# Patient Record
Sex: FEMALE | Race: WHITE | NOT HISPANIC OR LATINO | Employment: OTHER | URBAN - METROPOLITAN AREA
[De-identification: names, ages, dates, MRNs, and addresses within clinical notes are randomized per-mention and may not be internally consistent; named-entity substitution may affect disease eponyms.]

---

## 2018-02-19 ENCOUNTER — OFFICE VISIT (OUTPATIENT)
Dept: OBSTETRICS AND GYNECOLOGY | Facility: CLINIC | Age: 28
End: 2018-02-19
Attending: OBSTETRICS & GYNECOLOGY
Payer: COMMERCIAL

## 2018-02-19 VITALS
HEIGHT: 64 IN | DIASTOLIC BLOOD PRESSURE: 70 MMHG | BODY MASS INDEX: 24.17 KG/M2 | SYSTOLIC BLOOD PRESSURE: 120 MMHG | WEIGHT: 141.56 LBS

## 2018-02-19 DIAGNOSIS — Z01.419 WELL FEMALE EXAM WITH ROUTINE GYNECOLOGICAL EXAM: Primary | ICD-10-CM

## 2018-02-19 PROCEDURE — 88175 CYTOPATH C/V AUTO FLUID REDO: CPT

## 2018-02-19 PROCEDURE — 99999 PR PBB SHADOW E&M-EST. PATIENT-LVL III: CPT | Mod: PBBFAC,,, | Performed by: OBSTETRICS & GYNECOLOGY

## 2018-02-19 PROCEDURE — 99385 PREV VISIT NEW AGE 18-39: CPT | Mod: S$GLB,,, | Performed by: OBSTETRICS & GYNECOLOGY

## 2018-02-19 NOTE — PROGRESS NOTES
SUBJECTIVE:   27 y.o. female   for routine gyn exam. Patient's last menstrual period was 2018..  She has no unusual complaints.Has Mirena since .  Considering pregnancy later this year.         Past Medical History:   Diagnosis Date    Abnormal Pap smear     since , Colpo + HPV    Anemia     Asthma     mild     Past Surgical History:   Procedure Laterality Date    COLPOSCOPY      KIDNEY STONE SURGERY  2015    right shoulder      SHOULDER SURGERY      right shoulder    THUMB ARTHROSCOPY      thumb surgery    thumb surgery       Social History     Social History    Marital status: Single     Spouse name: N/A    Number of children: N/A    Years of education: N/A     Occupational History    [employer]        Social History Main Topics    Smoking status: Never Smoker    Smokeless tobacco: Never Used    Alcohol use Yes      Comment: socially    Drug use: No    Sexual activity: Yes     Partners: Male     Birth control/ protection: IUD      Comment: Mirena/2012     Other Topics Concern    Not on file     Social History Narrative    No narrative on file     Family History   Problem Relation Age of Onset    Ovarian cancer Paternal Grandmother     Colon cancer Neg Hx     Breast cancer Neg Hx      OB History    Para Term  AB Living   1 1 1     1   SAB TAB Ectopic Multiple Live Births           1      # Outcome Date GA Lbr Oneil/2nd Weight Sex Delivery Anes PTL Lv   1 Term 12 37w0d  2.863 kg (6 lb 5 oz) F Vag-Spont EPI  JEVON      Birth Comments: System Generated. Please review and update pregnancy details.            No current outpatient prescriptions on file.     No current facility-administered medications for this visit.      Allergies: Patient has no known allergies.     ROS:  Constitutional: no weight loss, weight gain, fever, fatigue  Eyes:  No vision changes, glasses/contacts  ENT/Mouth: No ulcers, sinus problems, ears ringing,  "headache  Cardiovascular: No inability to lie flat, chest pain, exercise intolerance, swelling, heart palpitations  Respiratory: No wheezing, coughing blood, shortness of breath, or cough  Gastrointestinal: No diarrhea, bloody stool, nausea/vomiting, constipation, gas, hemorrhoids  Genitourinary: No blood in urine, painful urination, urgency of urination, frequency of urination, incomplete emptying, incontinence, abnormal bleeding, painful periods, heavy periods, vaginal discharge, vaginal odor, painful intercourse, sexual problems, bleeding after intercourse.  Musculoskeletal: No muscle weakness  Skin/Breast: No painful breasts, nipple discharge, masses, rash, ulcers  Neurological: No passing out, seizures, numbness, headache  Endocrine: No diabetes, hypothyroid, hyperthyroid, hot flashes, hair loss, abnormal hair growth, ance  Psychiatric: No depression, crying  Hematologic: No bruises, bleeding, swollen lymph nodes, anemia.      OBJECTIVE:   The patient appears well, alert, oriented x 3, in no distress.  /70 (BP Location: Left arm, Patient Position: Sitting, BP Method: Medium (Manual))   Ht 5' 4" (1.626 m)   Wt 64.2 kg (141 lb 8.6 oz)   LMP 01/28/2018 Comment: Mirena  BMI 24.29 kg/m²   NECK: no thyromegaly, trachea midline  SKIN: no acne, striae, hirsutism  CHEST: CTAB  CV: RRR  BREAST EXAM: breasts appear normal, no suspicious masses, no skin or nipple changes or axillary nodes  ABDOMEN: no hernias, masses, or hepatosplenomegaly  GENITALIA: normal external genitalia, no erythema, no discharge  URETHRA: normal urethra, normal urethral meatus  VAGINA: Normal  CERVIX: no lesions or cervical motion tenderness.  IUD strings seen  UTERUS: normal size, contour, position, consistency, mobility, non-tender  ADNEXA: no mass, fullness, tenderness      ASSESSMENT:   1. Well female exam with routine gynecological exam  Liquid-based pap smear, screening       PLAN:   Orders Placed This Encounter    Liquid-based pap " smear, screening     Discussed Mirena approved for 5 years in [place], but up to 7 years in [place].  Discussed IUD removal now, OCP's until pregnancy desires.  Declines removal today.  Patient will RTC summer time for IUD removal.  Return to clinic in 1 year

## 2018-06-05 ENCOUNTER — TELEPHONE (OUTPATIENT)
Dept: OBSTETRICS AND GYNECOLOGY | Facility: CLINIC | Age: 28
End: 2018-06-05

## 2018-06-05 NOTE — TELEPHONE ENCOUNTER
----- Message from Cecilia Luke sent at 6/5/2018  8:00 AM CDT -----  Contact: REINIER FREDERICK [[id number]]  Name of Who is Calling: REINIER FREDERICK [[id number]]    What is the request in detail: pt would like to have IUD removed on today states she is having problems with IUD. Please call     Can the clinic reply by [facility]: no    What Number to Call Back if not in [facility]: [phone]

## 2018-06-05 NOTE — TELEPHONE ENCOUNTER
Returned call -discuss no available appointment today, but could schedule IUD removal Wednesday. Patient would like medication for possible UTI. Advised to call [facility] or will address during visit Wednesday. She verbalized understanding

## 2018-06-06 ENCOUNTER — OFFICE VISIT (OUTPATIENT)
Dept: OBSTETRICS AND GYNECOLOGY | Facility: CLINIC | Age: 28
End: 2018-06-06
Attending: OBSTETRICS & GYNECOLOGY
Payer: COMMERCIAL

## 2018-06-06 VITALS
HEIGHT: 64 IN | SYSTOLIC BLOOD PRESSURE: 120 MMHG | DIASTOLIC BLOOD PRESSURE: 70 MMHG | WEIGHT: 145.94 LBS | BODY MASS INDEX: 24.92 KG/M2

## 2018-06-06 DIAGNOSIS — R31.0 GROSS HEMATURIA: ICD-10-CM

## 2018-06-06 DIAGNOSIS — Z30.432 ENCOUNTER FOR IUD REMOVAL: Primary | ICD-10-CM

## 2018-06-06 PROCEDURE — 58301 REMOVE INTRAUTERINE DEVICE: CPT | Mod: S$GLB,,, | Performed by: OBSTETRICS & GYNECOLOGY

## 2018-06-06 PROCEDURE — 3008F BODY MASS INDEX DOCD: CPT | Mod: CPTII,S$GLB,, | Performed by: OBSTETRICS & GYNECOLOGY

## 2018-06-06 PROCEDURE — 81001 URINALYSIS AUTO W/SCOPE: CPT

## 2018-06-06 PROCEDURE — 87086 URINE CULTURE/COLONY COUNT: CPT

## 2018-06-06 PROCEDURE — 99999 PR PBB SHADOW E&M-EST. PATIENT-LVL III: CPT | Mod: PBBFAC,,, | Performed by: OBSTETRICS & GYNECOLOGY

## 2018-06-06 PROCEDURE — 99213 OFFICE O/P EST LOW 20 MIN: CPT | Mod: 25,S$GLB,, | Performed by: OBSTETRICS & GYNECOLOGY

## 2018-06-07 LAB
BACTERIA #/AREA URNS AUTO: ABNORMAL /HPF
BILIRUB UR QL STRIP: NEGATIVE
CLARITY UR REFRACT.AUTO: ABNORMAL
COLOR UR AUTO: YELLOW
GLUCOSE UR QL STRIP: NEGATIVE
HGB UR QL STRIP: ABNORMAL
KETONES UR QL STRIP: NEGATIVE
LEUKOCYTE ESTERASE UR QL STRIP: NEGATIVE
MICROSCOPIC COMMENT: ABNORMAL
NITRITE UR QL STRIP: NEGATIVE
PH UR STRIP: 7 [PH] (ref 5–8)
PROT UR QL STRIP: NEGATIVE
RBC #/AREA URNS AUTO: 10 /HPF (ref 0–4)
SP GR UR STRIP: 1.01 (ref 1–1.03)
SQUAMOUS #/AREA URNS AUTO: 6 /HPF
URN SPEC COLLECT METH UR: ABNORMAL
UROBILINOGEN UR STRIP-ACNC: NEGATIVE EU/DL
WBC #/AREA URNS AUTO: 3 /HPF (ref 0–5)

## 2018-06-07 NOTE — PROGRESS NOTES
SUBJECTIVE:   27 y.o. female   for IUD removal. No LMP recorded. Patient is not currently having periods (Reason: Birth Control)..  Desires pregnancy.  Reports blood in urine and urinary frequency.  Given Levaquin by PCP.  Also has some bloating and mild right sided pain.         Past Medical History:   Diagnosis Date    Abnormal Pap smear     since , Colpo + HPV    Anemia     Asthma     mild     Past Surgical History:   Procedure Laterality Date    COLPOSCOPY      KIDNEY STONE SURGERY  2015    right shoulder      SHOULDER SURGERY      right shoulder    THUMB ARTHROSCOPY      thumb surgery    thumb surgery       Social History     Social History    Marital status: Single     Spouse name: N/A    Number of children: N/A    Years of education: N/A     Occupational History    [employer]        Social History Main Topics    Smoking status: Never Smoker    Smokeless tobacco: Never Used    Alcohol use Yes      Comment: socially    Drug use: No    Sexual activity: Yes     Partners: Male     Birth control/ protection: IUD      Comment: Mirena/2012     Other Topics Concern    Not on file     Social History Narrative    No narrative on file     Family History   Problem Relation Age of Onset    Ovarian cancer Paternal Grandmother     Colon cancer Neg Hx     Breast cancer Neg Hx      OB History    Para Term  AB Living   1 1 1     1   SAB TAB Ectopic Multiple Live Births           1      # Outcome Date GA Lbr Oneil/2nd Weight Sex Delivery Anes PTL Lv   1 Term 12 37w0d  2.863 kg (6 lb 5 oz) F Vag-Spont EPI  JEVON      Birth Comments: System Generated. Please review and update pregnancy details.            Current Outpatient Prescriptions   Medication Sig Dispense Refill    levoFLOXacin (LEVAQUIN) 500 MG tablet Take 1 tablet (500 mg total) by mouth once daily. 5 tablet 0     No current facility-administered medications for this visit.      Allergies: Patient  "has no known allergies.     ROS:  Constitutional: no weight loss, weight gain, fever, fatigue  Eyes:  No vision changes, glasses/contacts  ENT/Mouth: No ulcers, sinus problems, ears ringing, headache  Cardiovascular: No inability to lie flat, chest pain, exercise intolerance, swelling, heart palpitations  Respiratory: No wheezing, coughing blood, shortness of breath, or cough  Gastrointestinal: No diarrhea, bloody stool, nausea/vomiting, constipation, gas, hemorrhoids  Genitourinary: +blood in urine, no painful urination, +urgency of urination, frequency of urination, incomplete emptying, incontinence, abnormal bleeding, painful periods, heavy periods, vaginal discharge, vaginal odor, painful intercourse, sexual problems, bleeding after intercourse.  Musculoskeletal: No muscle weakness  Skin/Breast: No painful breasts, nipple discharge, masses, rash, ulcers  Neurological: No passing out, seizures, numbness, headache  Endocrine: No diabetes, hypothyroid, hyperthyroid, hot flashes, hair loss, abnormal hair growth, ance  Psychiatric: No depression, crying  Hematologic: No bruises, bleeding, swollen lymph nodes, anemia.      OBJECTIVE:   The patient appears well, alert, oriented x 3, in no distress.  /70 (BP Location: Right arm, Patient Position: Sitting, BP Method: Medium (Manual))   Ht 5' 4" (1.626 m)   Wt 66.2 kg (145 lb 15.1 oz)   BMI 25.05 kg/m²   ABDOMEN: no hernias, masses, or hepatosplenomegaly  GENITALIA: normal external genitalia, no erythema, bloody discharge  URETHRA: normal urethra, normal urethral meatus  VAGINA: Normal  CERVIX: no lesions or cervical motion tenderness.  IUD strings seen  UTERUS: normal size, contour, position, consistency, mobility, non-tender  ADNEXA: no mass, fullness, tenderness    IUD removed    ASSESSMENT:   1. Encounter for IUD removal     2. Gross hematuria  Urinalysis    Urine culture       PLAN:   Orders Placed This Encounter    Urine culture    Urinalysis "     Discussed IUD removal, folic acid  Discussed urinary sx.  Plan U/A C&S.  Consider pelvic u/s if no improvement on ABX  Return to clinic prn

## 2018-06-08 LAB — BACTERIA UR CULT: NO GROWTH

## 2018-06-11 ENCOUNTER — TELEPHONE (OUTPATIENT)
Dept: UROLOGY | Facility: CLINIC | Age: 28
End: 2018-06-11

## 2018-06-11 ENCOUNTER — OFFICE VISIT (OUTPATIENT)
Dept: UROLOGY | Facility: CLINIC | Age: 28
End: 2018-06-11
Payer: COMMERCIAL

## 2018-06-11 ENCOUNTER — HOSPITAL ENCOUNTER (OUTPATIENT)
Dept: RADIOLOGY | Facility: HOSPITAL | Age: 28
Discharge: HOME | End: 2018-06-11
Attending: NURSE PRACTITIONER
Payer: COMMERCIAL

## 2018-06-11 VITALS
HEIGHT: 64 IN | SYSTOLIC BLOOD PRESSURE: 118 MMHG | HEART RATE: 78 BPM | BODY MASS INDEX: 24.41 KG/M2 | DIASTOLIC BLOOD PRESSURE: 82 MMHG | WEIGHT: 143 LBS

## 2018-06-11 DIAGNOSIS — R39.89 SENSATION OF PRESSURE IN BLADDER AREA: ICD-10-CM

## 2018-06-11 DIAGNOSIS — R35.0 URINARY FREQUENCY: ICD-10-CM

## 2018-06-11 DIAGNOSIS — Z87.442 HISTORY OF KIDNEY STONES: ICD-10-CM

## 2018-06-11 DIAGNOSIS — R35.0 URINARY FREQUENCY: Primary | ICD-10-CM

## 2018-06-11 DIAGNOSIS — R39.89 BLADDER PAIN: ICD-10-CM

## 2018-06-11 DIAGNOSIS — R10.9 FLANK PAIN: ICD-10-CM

## 2018-06-11 DIAGNOSIS — N20.1 LEFT URETERAL CALCULUS: ICD-10-CM

## 2018-06-11 LAB
BILIRUB SERPL-MCNC: NORMAL MG/DL
BLOOD URINE, POC: 250
COLOR, POC UA: YELLOW
GLUCOSE UR QL STRIP: NORMAL
KETONES UR QL STRIP: NORMAL
LEUKOCYTE ESTERASE URINE, POC: NORMAL
NITRITE, POC UA: NORMAL
PH, POC UA: 8
PROTEIN, POC: NORMAL
SPECIFIC GRAVITY, POC UA: 1
UROBILINOGEN, POC UA: NORMAL

## 2018-06-11 PROCEDURE — 74018 RADEX ABDOMEN 1 VIEW: CPT | Mod: TC

## 2018-06-11 PROCEDURE — 3008F BODY MASS INDEX DOCD: CPT | Mod: CPTII,S$GLB,, | Performed by: NURSE PRACTITIONER

## 2018-06-11 PROCEDURE — 81001 URINALYSIS AUTO W/SCOPE: CPT | Mod: S$GLB,,, | Performed by: NURSE PRACTITIONER

## 2018-06-11 PROCEDURE — 99214 OFFICE O/P EST MOD 30 MIN: CPT | Mod: 25,S$GLB,, | Performed by: NURSE PRACTITIONER

## 2018-06-11 PROCEDURE — 99999 PR PBB SHADOW E&M-EST. PATIENT-LVL IV: CPT | Mod: PBBFAC,,, | Performed by: NURSE PRACTITIONER

## 2018-06-11 PROCEDURE — 74018 RADEX ABDOMEN 1 VIEW: CPT | Mod: 26,,, | Performed by: RADIOLOGY

## 2018-06-11 RX ORDER — PHENAZOPYRIDINE HYDROCHLORIDE 200 MG/1
200 TABLET, FILM COATED ORAL 3 TIMES DAILY PRN
Qty: 15 TABLET | Refills: 0 | Status: SHIPPED | OUTPATIENT
Start: 2018-06-11 | End: 2018-06-16

## 2018-06-11 NOTE — TELEPHONE ENCOUNTER
Spoke with patient regarding her KUB results.  After reviewing KUB with Dr. Ledesma, will cancel the renal US and order CT RSS.  CT RSS ordered and scheduled.  Pt verbalized understanding.

## 2018-06-11 NOTE — PATIENT INSTRUCTIONS
AZO    Get prompt medical attention if any of the following occur:  · Severe pain that returns and not relieved by pain medicines  · Repeated vomiting or unable to keep down fluids  · Weakness, dizziness or fainting  · Fever of 101.4ºF (38ºC) or higher, or as directed by your healthcare provider  · Blood clots in urine  · Foul smelling or cloudy urine  · Unable to pass urine for 8 hours or increasing bladder pressure      Increase water intake 2-3 liters per day.     Avoid Bladder Irritants: Tea, coffee, caffeine, alcohol, artificial sweeteners, citrus, spicy foods, acidic foods,chocolate, tomato-based foods, smoking.      Cystoscopy    Cystoscopy is a procedure that lets your doctor look directly inside your urethra and bladder. It can be used to:  · Help diagnose a problem with your urethra, bladder, or kidneys.  · Take a sample (biopsy) of bladder or urethral tissue.  · Treat certain problems (such as removing kidney stones).  · Place a stent to bypass an obstruction.  · Take special X-rays of the kidneys.  Based on the findings, your doctor may recommend other tests or treatments.  What is a cystoscope?  A cystoscope is a telescope-like instrument that contains lenses and fiberoptics (small glass wires that make bright light). The cystoscope may be straight and rigid, or flexible to bend around curves in the urethra. The doctor may look directly into the cystoscope, or project the image onto a monitor.  Getting ready  · Ask your doctor if you should stop taking any medicines before the procedure.  · Ask whether you should avoid eating or drinking anything after midnight before the procedure.  · Follow any other instructions your doctor gives you.  Tell your doctor before the exam if you:  · Take any medicines, such as aspirin or blood thinners  · Have allergies to any medicines  · Are pregnant   The procedure  Cystoscopy is done in the doctors office, surgery center, or hospital. The doctor and a nurse are  present during the procedure. It takes only a few minutes, longer if a biopsy, X-ray, or treatment needs to be done.  During the procedure:  · You lie on an exam table on your back, knees bent and legs apart. You are covered with a drape.  · Your urethra and the area around it are washed. Anesthetic jelly may be applied to numb the urethra. Other pain medicine is usually not needed. In some cases, you may be offered a mild sedative to help you relax. If a more extensive procedure is to be done, such as a biopsy or kidney stone removal, general anesthesia may be needed.  · The cystoscope is inserted. A sterile fluid is put into the bladder to expand it. You may feel pressure from this fluid.  · When the procedure is done, the cystoscope is removed.  After the procedure  If you had a sedative, general anesthesia, or spinal anesthesia, you must have someone drive you home. Once youre home:  · Drink plenty of fluids.  · You may have burning or light bleeding when you urinate--this is normal.  · Medicines may be prescribed to ease any discomfort or prevent infection. Take these as directed.  · Call your doctor if you have heavy bleeding or blood clots, burning that lasts more than a day, a fever over 100°F  (38° C), or trouble urinating.  Date Last Reviewed: 1/1/2017 © 2000-2017 [employer]. [address] [place] [address], [place] [address]. All rights reserved. This information is not intended as a substitute for professional medical care. Always follow your healthcare professional's instructions.

## 2018-06-11 NOTE — PROGRESS NOTES
Subjective:       Patient ID: Melissa Agustin is a 27 y.o. female.    Chief Complaint: Urinary Frequency; Urinary Tract Infection; and Nephrolithiasis (history)      HPI: Melissa Agustin is a 27 y.o. White female who presents today for evaluation and management of UTI, bladder pain and pressure, urinary frequency and hx of kidney stones. She is an established patient with [facility] but a new patient to me. Her last clinic visit was 4/23/16 with Dr. Ledesma.    Hx of right ureteral stone in 2016. Had ESWL.    Today she presents to clinic for bladder pain and pressure, urinary frequency, and possible UTI for the past week. She reports last Monday she had gross hematuria that has since resolved. She states she had a sharp pain to left lower back last week that has also resolved. She denies dysuria associated with hematuria or low back pain. She was treated for a UTI recently with levaquin that she completed 3 days ago (urine culture was negative).  She feels a throbbing bladder pain and constant pressure that has not improved with completing antibiotics. She reports urinary frequency every 20-30 minutes. She denies incontinence, difficulty urinating, urgency. Denies f/c/n/v.     6/6/18 Urine culture:  No growth    6/6/18 microscopic UA:  RBC 10    Review of patient's allergies indicates:  No Known Allergies    Current Outpatient Prescriptions   Medication Sig Dispense Refill    phenazopyridine (PYRIDIUM) 200 MG tablet Take 1 tablet (200 mg total) by mouth 3 (three) times daily as needed for Pain. 15 tablet 0     No current facility-administered medications for this visit.        Past Medical History:   Diagnosis Date    Abnormal Pap smear     since , Colpo + HPV    Anemia     Asthma     mild       Past Surgical History:   Procedure Laterality Date    COLPOSCOPY      KIDNEY STONE SURGERY  2015    right shoulder      SHOULDER SURGERY      right shoulder    THUMB ARTHROSCOPY      thumb surgery    thumb surgery          Family History   Problem Relation Age of Onset    Ovarian cancer Paternal Grandmother     Colon cancer Neg Hx     Breast cancer Neg Hx        Review of Systems   Constitutional: Negative for chills, diaphoresis, fatigue and fever.   HENT: Negative for congestion and trouble swallowing.    Eyes: Negative for visual disturbance.   Respiratory: Negative for chest tightness and shortness of breath.    Cardiovascular: Negative for chest pain and palpitations.   Gastrointestinal: Negative for nausea and vomiting.   Genitourinary: Positive for frequency, hematuria and pelvic pain (bladder pain / pressure). Negative for decreased urine volume, difficulty urinating, dysuria, enuresis and urgency.   Musculoskeletal: Negative for gait problem.   Skin: Negative for pallor and rash.   Allergic/Immunologic: Negative for immunocompromised state.   Neurological: Negative for dizziness, seizures, syncope, weakness, light-headedness and headaches.   Hematological: Negative for adenopathy.   Psychiatric/Behavioral: Negative for confusion. The patient is not nervous/anxious.          All other systems were reviewed and were negative.    Objective:     Vitals:    06/11/18 1349   BP: 118/82   Pulse: 78        Physical Exam   Nursing note and vitals reviewed.  Constitutional: She is oriented to person, place, and time. She appears well-developed and well-nourished.  Non-toxic appearance. She does not have a sickly appearance. She does not appear ill. No distress.   HENT:   Head: Normocephalic and atraumatic.   Eyes: Conjunctivae and EOM are normal.   Neck: Normal range of motion.   Cardiovascular: Normal rate and regular rhythm.    Pulmonary/Chest: Effort normal. No respiratory distress.   Abdominal: Soft. She exhibits no distension. There is no CVA tenderness.   Musculoskeletal: Normal range of motion.   Neurological: She is alert and oriented to person, place, and time.   Skin: Skin is warm and dry.     Psychiatric: She has a  normal mood and affect. Her behavior is normal. Judgment and thought content normal.         Lab Results   Component Value Date    CREATININE 0.9 03/17/2016     Lab Results   Component Value Date    EGFRNONAA >60.0 03/17/2016     Lab Results   Component Value Date    ESTGFRAFRICA >60.0 03/17/2016     Urine dipstick in clinic: 250 blood, otherwise negative    Assessment:       1. Urinary frequency    2. Sensation of pressure in bladder area    3. Bladder pain    4. History of kidney stones        Plan:     Melissa was seen today for urinary frequency, urinary tract infection and nephrolithiasis.    Diagnoses and all orders for this visit:    Urinary frequency  -     POCT urinalysis, dipstick or tablet reag  -     X-Ray Abdomen AP 1 View; Future  -     US Retroperitoneal Complete (Kidney and; Future    Sensation of pressure in bladder area  -     POCT urinalysis, dipstick or tablet reag  -     X-Ray Abdomen AP 1 View; Future  -     US Retroperitoneal Complete (Kidney and; Future  -     phenazopyridine (PYRIDIUM) 200 MG tablet; Take 1 tablet (200 mg total) by mouth 3 (three) times daily as needed for Pain.    Bladder pain  -     X-Ray Abdomen AP 1 View; Future  -     US Retroperitoneal Complete (Kidney and; Future    History of kidney stones  -     X-Ray Abdomen AP 1 View; Future  -     US Retroperitoneal Complete (Kidney and; Future    -Discussed plan of care with patient  -KUB and renal US ordered and scheduled  -Discussed side effects, indications, and MOA for pyridium. Prescription sent to the pharmacy. Pt verbalized understanding.  Use for bladder discomfort as needed.   -Increase water intake 2-3 liters per day  -Get prompt medical attention if any of the following occur:  · Severe pain that returns and not relieved by pain medicines  · Repeated vomiting or unable to keep down fluids  · Weakness, dizziness or fainting  · Fever of 101.4ºF (38ºC) or higher, or as directed by your healthcare provider  · Blood clots in  urine  · Foul smelling or cloudy urine  · Unable to pass urine for 8 hours or increasing bladder pressure  -Follow up pending imaging results        I spent 25 minutes with the patient of which more than half was spent in coordinating the patient's care as well as in direct consultation with the patient in regards to our treatment and plan.

## 2018-06-12 ENCOUNTER — TELEPHONE (OUTPATIENT)
Dept: UROLOGY | Facility: CLINIC | Age: 28
End: 2018-06-12

## 2018-06-12 DIAGNOSIS — N20.1 LEFT URETERAL STONE: Primary | ICD-10-CM

## 2018-06-12 DIAGNOSIS — N20.1 LEFT URETERAL STONE: ICD-10-CM

## 2018-06-12 RX ORDER — KETOROLAC TROMETHAMINE 10 MG/1
10 TABLET, FILM COATED ORAL EVERY 8 HOURS PRN
Qty: 20 TABLET | Refills: 0 | Status: SHIPPED | OUTPATIENT
Start: 2018-06-12 | End: 2018-06-17

## 2018-06-12 RX ORDER — TAMSULOSIN HYDROCHLORIDE 0.4 MG/1
0.4 CAPSULE ORAL DAILY
Qty: 20 CAPSULE | Refills: 0 | Status: SHIPPED | OUTPATIENT
Start: 2018-06-12 | End: 2018-06-12 | Stop reason: SDUPTHER

## 2018-06-12 RX ORDER — TAMSULOSIN HYDROCHLORIDE 0.4 MG/1
0.4 CAPSULE ORAL DAILY
Qty: 20 CAPSULE | Refills: 0 | Status: SHIPPED | OUTPATIENT
Start: 2018-06-12 | End: 2018-10-29 | Stop reason: ALTCHOICE

## 2018-06-12 RX ORDER — KETOROLAC TROMETHAMINE 10 MG/1
10 TABLET, FILM COATED ORAL EVERY 8 HOURS PRN
Qty: 20 TABLET | Refills: 0 | Status: SHIPPED | OUTPATIENT
Start: 2018-06-12 | End: 2018-06-12 | Stop reason: SDUPTHER

## 2018-06-12 RX ORDER — OXYCODONE AND ACETAMINOPHEN 5; 325 MG/1; MG/1
1 TABLET ORAL EVERY 6 HOURS PRN
Qty: 20 TABLET | Refills: 0 | Status: SHIPPED | OUTPATIENT
Start: 2018-06-12 | End: 2018-06-17

## 2018-06-12 NOTE — TELEPHONE ENCOUNTER
Spoke with patient regarding CT RSS results.  Impression: there are 2 punctate nonobstructing calculi seen in the left kidney.  There is a 4.5 mm calculus seen at the left ureteral vesicle junction, appearing as a new finding since prior study.    Discussed side effects, indications, and MOA for toradol. Prescription sent to the pharmacy. Pt verbalized understanding.  Discussed side effects, indications, and MOA for flomax. Prescription sent to the pharmacy. Pt verbalized understanding.    Will follow up in 3-4 weeks for repeat scan to see if passed stone. Instructed to strain urine and bring any stone fragments to lab for stone analysis.  Pt verbalized understanding.    Get prompt medical attention if any of the following occur:  · Severe pain that returns and not relieved by pain medicines  · Repeated vomiting or unable to keep down fluids  · Weakness, dizziness or fainting  · Fever of 101.4ºF (38ºC) or higher, or as directed by your healthcare provider  · Blood clots in urine  · Foul smelling or cloudy urine  · Unable to pass urine for 8 hours or increasing bladder pressure

## 2018-06-12 NOTE — TELEPHONE ENCOUNTER
Spoke with patient.   She continues to have increased bladder pain and pressure as well as lower abdominal pain.  Denies f/c/n/v.   Will order percocet as needed.  Discussed side effects, indications, and MOA for percocet. Prescription printed. Pt verbalized understanding. She will pick prescription up later in the day. Denies any reactions to percocet in the past.   viewed prior to dispensing.

## 2018-06-14 ENCOUNTER — TELEPHONE (OUTPATIENT)
Dept: UROLOGY | Facility: CLINIC | Age: 28
End: 2018-06-14

## 2018-06-14 DIAGNOSIS — N20.1 LEFT URETERAL STONE: Primary | ICD-10-CM

## 2018-06-14 DIAGNOSIS — N20.1 URETERAL STONE: Primary | ICD-10-CM

## 2018-06-14 DIAGNOSIS — N32.89 BLADDER SPASMS: ICD-10-CM

## 2018-06-14 RX ORDER — OXYBUTYNIN CHLORIDE 5 MG/1
5 TABLET ORAL 3 TIMES DAILY PRN
Qty: 30 TABLET | Refills: 0 | Status: SHIPPED | OUTPATIENT
Start: 2018-06-14 | End: 2018-10-29 | Stop reason: ALTCHOICE

## 2018-06-14 NOTE — TELEPHONE ENCOUNTER
Spoke with patient.  She is having throbbing, sharp pain in her bladder. She denies pain anywhere else.   Will order oxybutynin for bladder spasms.  Discussed side effects, indications, and MOA for oxybutynin. Prescription sent to the pharmacy. Pt verbalized understanding.    Consulted with Dr. Ledesma. Will put patient on surgery schedule for Friday 6/22/18 for possible ureteroscopy.  Will do KUB 6/20/18 prior to check stone.  Pt verbalized understanding.

## 2018-06-15 ENCOUNTER — TELEPHONE (OUTPATIENT)
Dept: PREADMISSION TESTING | Facility: HOSPITAL | Age: 28
End: 2018-06-15

## 2018-06-15 ENCOUNTER — ANESTHESIA EVENT (OUTPATIENT)
Dept: SURGERY | Facility: HOSPITAL | Age: 28
End: 2018-06-15
Payer: COMMERCIAL

## 2018-06-15 DIAGNOSIS — Z01.818 PREOP TESTING: Primary | ICD-10-CM

## 2018-06-15 NOTE — TELEPHONE ENCOUNTER
----- Message from Wilma Muhammad, RN sent at 6/15/2018 10:53 AM CDT -----  Needs hem profile ( hx anemia last h/h 9.5/28.4  2012)  // bmp  Pt is scheduled for KUB 6/20 at [facility] would like lab there.    Surgery 6/22      Maribell

## 2018-06-15 NOTE — PRE-PROCEDURE INSTRUCTIONS
Spoke to pt reviewed meds // sending instructions thru My Chart. Hx of anemia// last lab 2012 -- lab being ordered

## 2018-06-15 NOTE — PRE ADMISSION SCREENING
Anesthesia Assessment: Preoperative EQUATION    Planned Procedure: Procedure(s) (LRB):  URETEROSCOPY (Left)  LITHOTRIPSY, USING LASER (N/A)  Placement-Stent (Left)  Requested Anesthesia Type:General  Surgeon: Mau Ledesma Jr., MD  Service: Urology  Known or anticipated Date of Surgery:6/22/2018    Surgeon notes: reviewed    Electronic QUestionnaire Assessment completed via nurse interview with patient.      No Aq        Triage considerations:     The patient has no apparent active cardiac condition (No unstable coronary Syndrome such as severe unstable angina or recent [<1 month] myocardial infarction, decompensated CHF, severe valvular   disease or significant arrhythmia)    Previous anesthesia records:[facility] 3/17/16  [facility]    Last PCP note: within 1 month , within [facility] Dr. Vadiee  Subspecialty notes: urology    Other important co-morbidities:   Nephrolithiasis  Past hx anemia----------------last H/H 9.5/28.4   2012   hx of asthma as a child    Tests already available:  No recent tests.            Instructions given. (See in Nurse's note)    Optimization:     Plan:    Testing:  Hematology Profile and BMP   Pre-anesthesia  visit       Visit focus: no past history of anesthesia issues     Consultation:seen by pcp recently         Navigation: 27 yr old female with kidney stones, unable to pass. Scheduled for KUB 6/20 . If not passed will proceed with surgery.

## 2018-06-15 NOTE — ANESTHESIA PREPROCEDURE EVALUATION
Anesthesia Assessment: Preoperative EQUATION     Planned Procedure: Procedure(s) (LRB):  URETEROSCOPY (Left)  LITHOTRIPSY, USING LASER (N/A)  Placement-Stent (Left)  Requested Anesthesia Type:General  Surgeon: Mau Ledesma Jr., MD  Service: Urology  Known or anticipated Date of Surgery:6/22/2018     Surgeon notes: reviewed     Electronic QUestionnaire Assessment completed via nurse interview with patient.      No Aq           Triage considerations:      The patient has no apparent active cardiac condition (No unstable coronary Syndrome such as severe unstable angina or recent [<1 month] myocardial infarction, decompensated CHF, severe valvular   disease or significant arrhythmia)     Previous anesthesia records:GETA 3/17/16  ESWL     [facility]  Subspecialty notes: urology     Other important co-morbidities:   Nephrolithiasis  Past hx anemia----------------last H/H 9.5/28.4   2012   hx of asthma as a child     Tests already available:  No recent tests.                            Instructions given. (See in Nurse's note)     Optimization:               Plan:    Testing:  Hematology Profile and BMP   Pre-anesthesia  visit                                        Visit focus: no past history of anesthesia issues                           Consultation:seen by pcp recently                                Navigation: 27 yr old female with kidney stones, unable to pass. Scheduled for KUB 6/20 . If not passed will proceed with surgery.            06/15/2018  Melissa Agustin is a 27 y.o., female.    Anesthesia Evaluation    I have reviewed the Patient Summary Reports.    I have reviewed the Nursing Notes.   I have reviewed the Medications.     Review of Systems  Anesthesia Hx:  No problems with previous Anesthesia  History of prior surgery of interest to airway management or planning:  Previous anesthesia: MAC  2016 eswl with MAC.  Denies Family Hx of Anesthesia complications.   Denies Personal Hx of Anesthesia complications.   Social:  Non-Smoker, Social Alcohol Use    Hematology/Oncology:     Oncology Normal    -- Anemia: Hematology Comments: Last H/H 9.5/28.4   2012  Will repeat    EENT/Dental:EENT/Dental Normal   Cardiovascular:   Exercise tolerance: good    Pulmonary:   Asthma asymptomatic Asthma as a child   Renal/:   Chronic Renal Disease renal calculi    Hepatic/GI:  Hepatic/GI Normal    Musculoskeletal:  Musculoskeletal Normal    Neurological:  Neurology Normal    Endocrine:  Endocrine Normal    Dermatological:  Skin Normal    Psych:  Psychiatric Normal           Physical Exam  General:  Well nourished    Airway/Jaw/Neck:  Airway Findings: Mouth Opening: Normal Tongue: Normal  General Airway Assessment: Adult  Mallampati: II  Improves to II with phonation.  TM Distance: Normal, at least 6 cm  Jaw/Neck Findings:  Neck ROM: Normal ROM      Dental:  Dental Findings: In tact   Chest/Lungs:  Chest/Lungs Findings: Clear to auscultation, Normal Respiratory Rate     Heart/Vascular:  Heart Findings: Rate: Normal  Rhythm: Regular Rhythm  Sounds: Normal        Mental Status:  Mental Status Findings:  Cooperative, Alert and Oriented         Anesthesia Plan  Type of Anesthesia, risks & benefits discussed:  Anesthesia Type:  general  Patient's Preference:   Intra-op Monitoring Plan: standard ASA monitors  Intra-op Monitoring Plan Comments:   Post Op Pain Control Plan: multimodal analgesia  Post Op Pain Control Plan Comments:   Induction:   IV  Beta Blocker:         Informed Consent: Patient understands risks and agrees with Anesthesia plan.  Questions answered. Anesthesia consent signed with patient.  ASA Score: 1     Day of Surgery Review of History & Physical:    H&P update referred to the surgeon.         Ready For Surgery From Anesthesia Perspective.

## 2018-06-21 ENCOUNTER — TELEPHONE (OUTPATIENT)
Dept: UROLOGY | Facility: CLINIC | Age: 28
End: 2018-06-21

## 2018-06-21 NOTE — TELEPHONE ENCOUNTER
Called pt to confirm 8:30 arrival time for procedure. Gave pt NPO instructions and gave pt opportunity to ask questions. Pt verbalized understanding.        Spoke with patient told arrival time change to 8:30am

## 2018-06-22 ENCOUNTER — ANESTHESIA (OUTPATIENT)
Dept: SURGERY | Facility: HOSPITAL | Age: 28
End: 2018-06-22
Payer: COMMERCIAL

## 2018-06-22 ENCOUNTER — HOSPITAL ENCOUNTER (OUTPATIENT)
Facility: HOSPITAL | Age: 28
Discharge: HOME | End: 2018-06-22
Attending: UROLOGY | Admitting: UROLOGY
Payer: COMMERCIAL

## 2018-06-22 VITALS
BODY MASS INDEX: 24.41 KG/M2 | RESPIRATION RATE: 18 BRPM | HEIGHT: 64 IN | SYSTOLIC BLOOD PRESSURE: 114 MMHG | HEART RATE: 70 BPM | WEIGHT: 143 LBS | DIASTOLIC BLOOD PRESSURE: 71 MMHG | TEMPERATURE: 98 F | OXYGEN SATURATION: 100 %

## 2018-06-22 DIAGNOSIS — N20.1 URETERAL CALCULUS: ICD-10-CM

## 2018-06-22 LAB
B-HCG UR QL: NEGATIVE
CTP QC/QA: YES

## 2018-06-22 PROCEDURE — 63600175 PHARM REV CODE 636 W HCPCS: Performed by: NURSE ANESTHETIST, CERTIFIED REGISTERED

## 2018-06-22 PROCEDURE — 25000003 PHARM REV CODE 250: Performed by: NURSE ANESTHETIST, CERTIFIED REGISTERED

## 2018-06-22 PROCEDURE — 71000033 HC RECOVERY, INTIAL HOUR: Performed by: UROLOGY

## 2018-06-22 PROCEDURE — 36000706: Performed by: UROLOGY

## 2018-06-22 PROCEDURE — 52351 CYSTOURETERO & OR PYELOSCOPE: CPT | Mod: LT,,, | Performed by: UROLOGY

## 2018-06-22 PROCEDURE — 37000009 HC ANESTHESIA EA ADD 15 MINS: Performed by: UROLOGY

## 2018-06-22 PROCEDURE — 81025 URINE PREGNANCY TEST: CPT | Performed by: UROLOGY

## 2018-06-22 PROCEDURE — D9220A PRA ANESTHESIA: Mod: CRNA,,, | Performed by: NURSE ANESTHETIST, CERTIFIED REGISTERED

## 2018-06-22 PROCEDURE — 71000015 HC POSTOP RECOV 1ST HR: Performed by: UROLOGY

## 2018-06-22 PROCEDURE — 37000008 HC ANESTHESIA 1ST 15 MINUTES: Performed by: UROLOGY

## 2018-06-22 PROCEDURE — 76000 FLUOROSCOPY <1 HR PHYS/QHP: CPT | Mod: 26,59,, | Performed by: UROLOGY

## 2018-06-22 PROCEDURE — 36000707: Performed by: UROLOGY

## 2018-06-22 PROCEDURE — 25000003 PHARM REV CODE 250: Performed by: STUDENT IN AN ORGANIZED HEALTH CARE EDUCATION/TRAINING PROGRAM

## 2018-06-22 PROCEDURE — 63600175 PHARM REV CODE 636 W HCPCS: Performed by: STUDENT IN AN ORGANIZED HEALTH CARE EDUCATION/TRAINING PROGRAM

## 2018-06-22 PROCEDURE — D9220A PRA ANESTHESIA: Mod: ANES,,, | Performed by: ANESTHESIOLOGY

## 2018-06-22 RX ORDER — HYDROCODONE BITARTRATE AND ACETAMINOPHEN 5; 325 MG/1; MG/1
1 TABLET ORAL EVERY 4 HOURS PRN
Status: DISCONTINUED | OUTPATIENT
Start: 2018-06-22 | End: 2018-06-22 | Stop reason: HOSPADM

## 2018-06-22 RX ORDER — ONDANSETRON 8 MG/1
8 TABLET, ORALLY DISINTEGRATING ORAL EVERY 8 HOURS PRN
Status: DISCONTINUED | OUTPATIENT
Start: 2018-06-22 | End: 2018-06-22 | Stop reason: HOSPADM

## 2018-06-22 RX ORDER — HYDROMORPHONE HYDROCHLORIDE 1 MG/ML
0.2 INJECTION, SOLUTION INTRAMUSCULAR; INTRAVENOUS; SUBCUTANEOUS EVERY 5 MIN PRN
Status: DISCONTINUED | OUTPATIENT
Start: 2018-06-22 | End: 2018-06-22 | Stop reason: HOSPADM

## 2018-06-22 RX ORDER — SODIUM CHLORIDE 9 MG/ML
INJECTION, SOLUTION INTRAVENOUS CONTINUOUS
Status: DISCONTINUED | OUTPATIENT
Start: 2018-06-22 | End: 2018-06-22 | Stop reason: HOSPADM

## 2018-06-22 RX ORDER — FENTANYL CITRATE 50 UG/ML
INJECTION, SOLUTION INTRAMUSCULAR; INTRAVENOUS
Status: DISCONTINUED | OUTPATIENT
Start: 2018-06-22 | End: 2018-06-22

## 2018-06-22 RX ORDER — PROPOFOL 10 MG/ML
VIAL (ML) INTRAVENOUS
Status: DISCONTINUED | OUTPATIENT
Start: 2018-06-22 | End: 2018-06-22

## 2018-06-22 RX ORDER — ROCURONIUM BROMIDE 10 MG/ML
INJECTION, SOLUTION INTRAVENOUS
Status: DISCONTINUED | OUTPATIENT
Start: 2018-06-22 | End: 2018-06-22

## 2018-06-22 RX ORDER — LIDOCAINE HCL/PF 100 MG/5ML
SYRINGE (ML) INTRAVENOUS
Status: DISCONTINUED | OUTPATIENT
Start: 2018-06-22 | End: 2018-06-22

## 2018-06-22 RX ORDER — MIDAZOLAM HYDROCHLORIDE 1 MG/ML
INJECTION, SOLUTION INTRAMUSCULAR; INTRAVENOUS
Status: DISCONTINUED | OUTPATIENT
Start: 2018-06-22 | End: 2018-06-22

## 2018-06-22 RX ORDER — NEOSTIGMINE METHYLSULFATE 1 MG/ML
INJECTION, SOLUTION INTRAVENOUS
Status: DISCONTINUED | OUTPATIENT
Start: 2018-06-22 | End: 2018-06-22

## 2018-06-22 RX ORDER — GLYCOPYRROLATE 0.2 MG/ML
INJECTION INTRAMUSCULAR; INTRAVENOUS
Status: DISCONTINUED | OUTPATIENT
Start: 2018-06-22 | End: 2018-06-22

## 2018-06-22 RX ORDER — LIDOCAINE HYDROCHLORIDE 10 MG/ML
1 INJECTION, SOLUTION EPIDURAL; INFILTRATION; INTRACAUDAL; PERINEURAL ONCE
Status: COMPLETED | OUTPATIENT
Start: 2018-06-22 | End: 2018-06-22

## 2018-06-22 RX ORDER — DEXAMETHASONE SODIUM PHOSPHATE 4 MG/ML
INJECTION, SOLUTION INTRA-ARTICULAR; INTRALESIONAL; INTRAMUSCULAR; INTRAVENOUS; SOFT TISSUE
Status: DISCONTINUED | OUTPATIENT
Start: 2018-06-22 | End: 2018-06-22

## 2018-06-22 RX ORDER — SODIUM CHLORIDE 0.9 % (FLUSH) 0.9 %
3 SYRINGE (ML) INJECTION
Status: DISCONTINUED | OUTPATIENT
Start: 2018-06-22 | End: 2018-06-22 | Stop reason: HOSPADM

## 2018-06-22 RX ORDER — CEFAZOLIN SODIUM 1 G/3ML
2 INJECTION, POWDER, FOR SOLUTION INTRAMUSCULAR; INTRAVENOUS
Status: COMPLETED | OUTPATIENT
Start: 2018-06-22 | End: 2018-06-22

## 2018-06-22 RX ORDER — ONDANSETRON 2 MG/ML
INJECTION INTRAMUSCULAR; INTRAVENOUS
Status: DISCONTINUED | OUTPATIENT
Start: 2018-06-22 | End: 2018-06-22

## 2018-06-22 RX ADMIN — FENTANYL CITRATE 100 MCG: 50 INJECTION, SOLUTION INTRAMUSCULAR; INTRAVENOUS at 10:06

## 2018-06-22 RX ADMIN — MIDAZOLAM HYDROCHLORIDE 1 MG: 1 INJECTION, SOLUTION INTRAMUSCULAR; INTRAVENOUS at 10:06

## 2018-06-22 RX ADMIN — MIDAZOLAM HYDROCHLORIDE 3 MG: 1 INJECTION, SOLUTION INTRAMUSCULAR; INTRAVENOUS at 10:06

## 2018-06-22 RX ADMIN — ROCURONIUM BROMIDE 30 MG: 10 INJECTION, SOLUTION INTRAVENOUS at 10:06

## 2018-06-22 RX ADMIN — NEOSTIGMINE METHYLSULFATE 4 MG: 1 INJECTION INTRAVENOUS at 11:06

## 2018-06-22 RX ADMIN — CEFAZOLIN 2 G: 330 INJECTION, POWDER, FOR SOLUTION INTRAMUSCULAR; INTRAVENOUS at 10:06

## 2018-06-22 RX ADMIN — DEXAMETHASONE SODIUM PHOSPHATE 8 MG: 4 INJECTION, SOLUTION INTRAMUSCULAR; INTRAVENOUS at 10:06

## 2018-06-22 RX ADMIN — PROPOFOL 150 MG: 10 INJECTION, EMULSION INTRAVENOUS at 10:06

## 2018-06-22 RX ADMIN — LIDOCAINE HYDROCHLORIDE 80 MG: 20 INJECTION, SOLUTION INTRAVENOUS at 10:06

## 2018-06-22 RX ADMIN — SODIUM CHLORIDE: 0.9 INJECTION, SOLUTION INTRAVENOUS at 09:06

## 2018-06-22 RX ADMIN — GLYCOPYRROLATE 0.4 MG: 0.2 INJECTION INTRAMUSCULAR; INTRAVENOUS at 11:06

## 2018-06-22 RX ADMIN — ONDANSETRON 4 MG: 2 INJECTION INTRAMUSCULAR; INTRAVENOUS at 10:06

## 2018-06-22 RX ADMIN — LIDOCAINE HYDROCHLORIDE 10 MG: 10 INJECTION, SOLUTION EPIDURAL; INFILTRATION; INTRACAUDAL; PERINEURAL at 09:06

## 2018-06-22 RX ADMIN — SODIUM CHLORIDE, SODIUM GLUCONATE, SODIUM ACETATE, POTASSIUM CHLORIDE, MAGNESIUM CHLORIDE, SODIUM PHOSPHATE, DIBASIC, AND POTASSIUM PHOSPHATE: .53; .5; .37; .037; .03; .012; .00082 INJECTION, SOLUTION INTRAVENOUS at 11:06

## 2018-06-22 NOTE — OP NOTE
[facility]  Operative Note    Date: 06/22/2018    Pre-Op Diagnosis:   1. Left ureteral stone    Post-Op Diagnosis: no evidence of stone    Procedure(s) Performed:   1.  Left ureteroscopy  2.  Cystoscopy  3.  Fluoro < 1 h    Specimen(s): none    Staff Surgeon: Mau Ledesma MD    Assistant Surgeon: Veronica Keating MD    Anesthesia: General endotracheal anesthesia    Indications: Melissa Agustin is a 27 y.o. female with a left ureteral stone, presenting for definitive stone management.  She currently does not have a JJ ureteral stent in place.      Findings:   - no stones present in left ureter or bladder    Estimated Blood Loss: min    Drains: none    Procedure in detail:  After informed consent was obtained, the patient was brought the the cystoscopy suite and placed in the supine position.  SCDs were applied and working.  Anesthesia was administered.  The patient was then placed in the dorsal lithotomy position and prepped and draped in the usual sterile fashion.      A rigid cystoscope in a 22 Fr sheath was introduced into the patient's urethra.  This passed easily.  The entire urethra was visualized which showed no strictures or masses.  Formal cystoscopy was performed which revealed no masses or lesions suspicious for malignancy, no bladder stones, no bladder diverticuli, no trabeculations.  The ureteral orifices were visualized in the normal anatomic position bilaterally.      A guide wire was passed up the left ureteral orifice and up into the kidney.  This passed easily and placement was confirmed using fluoro.  The cystoscope was removed keeping the guidewire in place.      An 8 Fr rigid ureteroscope was passed into the patient's bladder alongside the wire under direct vision.  It was then passed through the left ureteral orifice alongside the wire. There were no stones present in the left ureter. The wire was removed and the bladder was drained.     The patient tolerated the procedure  well and was transferred to the recovery room in stable condition.      Disposition:  The patient will follow up with Dr. Ledesma in 6 weeks.      Veronica Keating MD

## 2018-06-22 NOTE — DISCHARGE SUMMARY
[facility]  Discharge Note  Short Stay    Admit Date: 6/22/2018    Discharge Date and Time: 06/22/2018 10:53 AM      Attending Physician: Mau Ledesma Jr., MD     Discharge Provider: Veronica Keating    Diagnoses:  Active Hospital Problems    Diagnosis  POA    *Ureteral calculus [N20.1]  Yes    Asthma in remission [J45.998]  Yes      Resolved Hospital Problems    Diagnosis Date Resolved POA   No resolved problems to display.       Discharged Condition: good    Hospital Course: Patient was admitted for left ureteroscopy and tolerated the procedure well with no complications. The patient was discharged home in good condition on the same day.       Final Diagnoses: Same as principal problem.    Disposition: Home or Self Care    Follow up/Patient Instructions:    Medications:  Reconciled Home Medications: Current Discharge Medication List      CONTINUE these medications which have NOT CHANGED    Details   oxybutynin (DITROPAN) 5 MG Tab Take 1 tablet (5 mg total) by mouth 3 (three) times daily as needed (bladder spasms).  Qty: 30 tablet, Refills: 0    Associated Diagnoses: Left ureteral stone; Bladder spasms      tamsulosin (FLOMAX) 0.4 mg Cp24 Take 1 capsule (0.4 mg total) by mouth once daily.  Qty: 20 capsule, Refills: 0    Associated Diagnoses: Left ureteral stone             Discharge Procedure Orders  Diet general     Activity as tolerated     Call MD for:  temperature >100.4     Call MD for:  persistent nausea and vomiting     Call MD for:  severe uncontrolled pain     No dressing needed       Follow-up Information     Mau Ledesma Jr, MD In 6 weeks.    Specialty:  Urology  Why:  post op  Contact information:  [address] [place] [id number]  [phone]                     Veronica Keating MD  Urology, PGY-3  Pager# [phone]

## 2018-06-22 NOTE — ANESTHESIA POSTPROCEDURE EVALUATION
"Anesthesia Post Evaluation    Patient: Melissa Agustin    Procedure(s) Performed: Procedure(s) (LRB):  URETEROSCOPY (Left)  CYSTOSCOPY    Final Anesthesia Type: general  Patient location during evaluation: PACU  Patient participation: Yes- Able to Participate  Level of consciousness: awake and alert  Post-procedure vital signs: reviewed and stable  Pain management: adequate  Airway patency: patent  PONV status at discharge: No PONV  Anesthetic complications: no      Cardiovascular status: blood pressure returned to baseline  Respiratory status: unassisted  Hydration status: euvolemic  Follow-up not needed.        Visit Vitals  /77 (BP Location: Left arm, Patient Position: Lying)   Pulse 99   Temp 37.1 °C (98.8 °F) (Temporal)   Resp 18   Ht 5' 4" (1.626 m)   Wt 64.9 kg (143 lb)   SpO2 100%   Breastfeeding? No   BMI 24.55 kg/m²       Pain/Kunal Score: Pain Assessment Performed: Yes (6/22/2018 11:15 AM)  Presence of Pain: denies (6/22/2018 11:15 AM)  Kunal Score: 10 (6/22/2018 11:15 AM)      "

## 2018-06-22 NOTE — DISCHARGE INSTRUCTIONS
Cystoscopy    Cystoscopy is a procedure that lets your doctor look directly inside your urethra and bladder. It can be used to:  · Help diagnose a problem with your urethra, bladder, or kidneys.  · Take a sample (biopsy) of bladder or urethral tissue.  · Treat certain problems (such as removing kidney stones).  · Place a stent to bypass an obstruction.  · Take special X-rays of the kidneys.  Based on the findings, your doctor may recommend other tests or treatments.  What is a cystoscope?  A cystoscope is a telescope-like instrument that contains lenses and fiberoptics (small glass wires that make bright light). The cystoscope may be straight and rigid, or flexible to bend around curves in the urethra. The doctor may look directly into the cystoscope, or project the image onto a monitor.  Getting ready  · Ask your doctor if you should stop taking any medicines before the procedure.  · Ask whether you should avoid eating or drinking anything after midnight before the procedure.  · Follow any other instructions your doctor gives you.  Tell your doctor before the exam if you:  · Take any medicines, such as aspirin or blood thinners  · Have allergies to any medicines  · Are pregnant   The procedure  Cystoscopy is done in the doctors office, surgery center, or hospital. The doctor and a nurse are present during the procedure. It takes only a few minutes, longer if a biopsy, X-ray, or treatment needs to be done.  During the procedure:  · You lie on an exam table on your back, knees bent and legs apart. You are covered with a drape.  · Your urethra and the area around it are washed. Anesthetic jelly may be applied to numb the urethra. Other pain medicine is usually not needed. In some cases, you may be offered a mild sedative to help you relax. If a more extensive procedure is to be done, such as a biopsy or kidney stone removal, general anesthesia may be needed.  · The cystoscope is inserted. A sterile fluid is put  into the bladder to expand it. You may feel pressure from this fluid.  · When the procedure is done, the cystoscope is removed.  After the procedure  If you had a sedative, general anesthesia, or spinal anesthesia, you must have someone drive you home. Once youre home:  · Drink plenty of fluids.  · You may have burning or light bleeding when you urinate--this is normal.  · Medicines may be prescribed to ease any discomfort or prevent infection. Take these as directed.  · Call your doctor if you have heavy bleeding or blood clots, burning that lasts more than a day, a fever over 100°F  (38° C), or trouble urinating.        Discharge Instructions: After Your Surgery  Youve just had surgery. During surgery, you were given medicine called anesthesia to keep you relaxed and free of pain. After surgery, you may have some pain or nausea. This is common. Here are some tips for feeling better and getting well after surgery.     Stay on schedule with your medicine.   Going home  Your healthcare provider will show you how to take care of yourself when you go home. He or she will also answer your questions. Have an adult family member or friend drive you home. For the first 24 hours after your surgery:  · Do not drive or use heavy equipment.  · Do not make important decisions or sign legal papers.  · Do not drink alcohol.  · Have someone stay with you, if needed. He or she can watch for problems and help keep you safe.  Be sure to go to all follow-up visits with your healthcare provider. And rest after your surgery for as long as your healthcare provider tells you to.  Coping with pain  If you have pain after surgery, pain medicine will help you feel better. Take it as told, before pain becomes severe. Also, ask your healthcare provider or pharmacist about other ways to control pain. This might be with heat, ice, or relaxation. And follow any other instructions your surgeon or nurse gives you.  Tips for taking pain  medicine  To get the best relief possible, remember these points:  · Pain medicines can upset your stomach. Taking them with a little food may help.  · Most pain relievers taken by mouth need at least 20 to 30 minutes to start to work.  · Taking medicine on a schedule can help you remember to take it. Try to time your medicine so that you can take it before starting an activity. This might be before you get dressed, go for a walk, or sit down for dinner.  · Constipation is a common side effect of pain medicines. Call your healthcare provider before taking any medicines such as laxatives or stool softeners to help ease constipation. Also ask if you should skip any foods. Drinking lots of fluids and eating foods such as fruits and vegetables that are high in fiber can also help. Remember, do not take laxatives unless your surgeon has prescribed them.  · Drinking alcohol and taking pain medicine can cause dizziness and slow your breathing. It can even be deadly. Do not drink alcohol while taking pain medicine.  · Pain medicine can make you react more slowly to things. Do not drive or run machinery while taking pain medicine.  Your healthcare provider may tell you to take acetaminophen to help ease your pain. Ask him or her how much you are supposed to take each day. Acetaminophen or other pain relievers may interact with your prescription medicines or other over-the-counter (OTC) medicines. Some prescription medicines have acetaminophen and other ingredients. Using both prescription and OTC acetaminophen for pain can cause you to overdose. Read the labels on your OTC medicines with care. This will help you to clearly know the list of ingredients, how much to take, and any warnings. It may also help you not take too much acetaminophen. If you have questions or do not understand the information, ask your pharmacist or healthcare provider to explain it to you before you take the OTC medicine.  Managing nausea  Some people  have an upset stomach after surgery. This is often because of anesthesia, pain, or pain medicine, or the stress of surgery. These tips will help you handle nausea and eat healthy foods as you get better. If you were on a special food plan before surgery, ask your healthcare provider if you should follow it while you get better. These tips may help:  · Do not push yourself to eat. Your body will tell you when to eat and how much.  · Start off with clear liquids and soup. They are easier to digest.  · Next try semi-solid foods, such as mashed potatoes, applesauce, and gelatin, as you feel ready.  · Slowly move to solid foods. Dont eat fatty, rich, or spicy foods at first.  · Do not force yourself to have 3 large meals a day. Instead eat smaller amounts more often.  · Take pain medicines with a small amount of solid food, such as crackers or toast, to avoid nausea.     Call your surgeon if  · You still have pain an hour after taking medicine. The medicine may not be strong enough.  · You feel too sleepy, dizzy, or groggy. The medicine may be too strong.  · You have side effects like nausea, vomiting, or skin changes, such as rash, itching, or hives.       If you have obstructive sleep apnea  You were given anesthesia medicine during surgery to keep you comfortable and free of pain. After surgery, you may have more apnea spells because of this medicine and other medicines you were given. The spells may last longer than usual.   At home:  · Keep using the continuous positive airway pressure (CPAP) device when you sleep. Unless your healthcare provider tells you not to, use it when you sleep, day or night. CPAP is a common device used to treat obstructive sleep apnea.  · Talk with your provider before taking any pain medicine, muscle relaxants, or sedatives. Your provider will tell you about the possible dangers of taking these medicines.

## 2018-06-22 NOTE — INTERVAL H&P NOTE
The patient has been examined and the H&P has been reviewed:    I concur with the findings and changes have been noted since the H&P was written: 4.5mm Left UVJ stone on CT RSS     Will plan for Left ureteroscopy, possible RGP, possible laser lithotripsy, possible placement of left ureteral stent.    Urine dipstick negative for all components today    Anesthesia/Surgery risks, benefits and alternative options discussed and understood by patient/family.          Active Hospital Problems    Diagnosis  POA    Ureteral calculus [N20.1]  Yes      Resolved Hospital Problems    Diagnosis Date Resolved POA   No resolved problems to display.

## 2018-06-22 NOTE — TRANSFER OF CARE
"Anesthesia Transfer of Care Note    Patient: Melissa Agustin    Procedure(s) Performed: Procedure(s) (LRB):  URETEROSCOPY (Left)  CYSTOSCOPY    Patient location: PACU    Anesthesia Type: general    Transport from OR: Transported from OR on 6-10 L/min O2 by face mask with adequate spontaneous ventilation    Post pain: adequate analgesia    Post assessment: no apparent anesthetic complications and tolerated procedure well    Post vital signs: stable    Level of consciousness: awake    Nausea/Vomiting: no nausea/vomiting    Complications: none    Transfer of care protocol was followed      Last vitals:   Visit Vitals  /72 (BP Location: Left arm, Patient Position: Lying)   Pulse (!) 54   Temp 37 °C (98.6 °F) (Temporal)   Resp 18   Ht 5' 4" (1.626 m)   Wt 64.9 kg (143 lb)   SpO2 100%   Breastfeeding? No   BMI 24.55 kg/m²     "

## 2018-10-26 ENCOUNTER — TELEPHONE (OUTPATIENT)
Dept: OBSTETRICS AND GYNECOLOGY | Facility: CLINIC | Age: 28
End: 2018-10-26

## 2018-10-26 DIAGNOSIS — Z32.01 POSITIVE PREGNANCY TEST: Primary | ICD-10-CM

## 2018-10-26 NOTE — TELEPHONE ENCOUNTER
Patient called to schedule New OB - she preferred to see provider first and schedule dating ultrasound at a later date.

## 2018-10-29 ENCOUNTER — OFFICE VISIT (OUTPATIENT)
Dept: OBSTETRICS AND GYNECOLOGY | Facility: CLINIC | Age: 28
End: 2018-10-29
Attending: OBSTETRICS & GYNECOLOGY
Payer: COMMERCIAL

## 2018-10-29 ENCOUNTER — LAB VISIT (OUTPATIENT)
Dept: LAB | Facility: OTHER | Age: 28
End: 2018-10-29
Attending: OBSTETRICS & GYNECOLOGY
Payer: COMMERCIAL

## 2018-10-29 VITALS
DIASTOLIC BLOOD PRESSURE: 64 MMHG | HEIGHT: 63 IN | BODY MASS INDEX: 27.34 KG/M2 | SYSTOLIC BLOOD PRESSURE: 130 MMHG | WEIGHT: 154.31 LBS

## 2018-10-29 DIAGNOSIS — N91.4 SECONDARY OLIGOMENORRHEA: ICD-10-CM

## 2018-10-29 DIAGNOSIS — N91.2 AMENORRHEA: ICD-10-CM

## 2018-10-29 DIAGNOSIS — N91.4 SECONDARY OLIGOMENORRHEA: Primary | ICD-10-CM

## 2018-10-29 DIAGNOSIS — Z32.01 POSITIVE PREGNANCY TEST: ICD-10-CM

## 2018-10-29 LAB
ABO + RH BLD: NORMAL
B-HCG UR QL: POSITIVE
BASOPHILS # BLD AUTO: 0.05 K/UL
BASOPHILS NFR BLD: 0.7 %
BLD GP AB SCN CELLS X3 SERPL QL: NORMAL
CTP QC/QA: YES
DIFFERENTIAL METHOD: NORMAL
EOSINOPHIL # BLD AUTO: 0.5 K/UL
EOSINOPHIL NFR BLD: 6.5 %
ERYTHROCYTE [DISTWIDTH] IN BLOOD BY AUTOMATED COUNT: 12.6 %
HCG INTACT+B SERPL-ACNC: 881 MIU/ML
HCT VFR BLD AUTO: 39.3 %
HGB BLD-MCNC: 13 G/DL
LYMPHOCYTES # BLD AUTO: 2 K/UL
LYMPHOCYTES NFR BLD: 29.1 %
MCH RBC QN AUTO: 29.4 PG
MCHC RBC AUTO-ENTMCNC: 33.1 G/DL
MCV RBC AUTO: 89 FL
MONOCYTES # BLD AUTO: 0.6 K/UL
MONOCYTES NFR BLD: 8.7 %
NEUTROPHILS # BLD AUTO: 3.8 K/UL
NEUTROPHILS NFR BLD: 54.9 %
PLATELET # BLD AUTO: 328 K/UL
PMV BLD AUTO: 10.2 FL
RBC # BLD AUTO: 4.42 M/UL
WBC # BLD AUTO: 6.9 K/UL

## 2018-10-29 PROCEDURE — 87186 SC STD MICRODIL/AGAR DIL: CPT

## 2018-10-29 PROCEDURE — 99999 PR PBB SHADOW E&M-EST. PATIENT-LVL III: CPT | Mod: PBBFAC,,, | Performed by: OBSTETRICS & GYNECOLOGY

## 2018-10-29 PROCEDURE — 81025 URINE PREGNANCY TEST: CPT | Mod: S$GLB,,, | Performed by: OBSTETRICS & GYNECOLOGY

## 2018-10-29 PROCEDURE — 86762 RUBELLA ANTIBODY: CPT

## 2018-10-29 PROCEDURE — 36415 COLL VENOUS BLD VENIPUNCTURE: CPT

## 2018-10-29 PROCEDURE — 3008F BODY MASS INDEX DOCD: CPT | Mod: CPTII,S$GLB,, | Performed by: OBSTETRICS & GYNECOLOGY

## 2018-10-29 PROCEDURE — 81003 URINALYSIS AUTO W/O SCOPE: CPT

## 2018-10-29 PROCEDURE — 86592 SYPHILIS TEST NON-TREP QUAL: CPT

## 2018-10-29 PROCEDURE — 87086 URINE CULTURE/COLONY COUNT: CPT

## 2018-10-29 PROCEDURE — 87591 N.GONORRHOEAE DNA AMP PROB: CPT

## 2018-10-29 PROCEDURE — 86901 BLOOD TYPING SEROLOGIC RH(D): CPT

## 2018-10-29 PROCEDURE — 99214 OFFICE O/P EST MOD 30 MIN: CPT | Mod: S$GLB,,, | Performed by: OBSTETRICS & GYNECOLOGY

## 2018-10-29 PROCEDURE — 87340 HEPATITIS B SURFACE AG IA: CPT

## 2018-10-29 PROCEDURE — 87088 URINE BACTERIA CULTURE: CPT

## 2018-10-29 PROCEDURE — 84702 CHORIONIC GONADOTROPIN TEST: CPT

## 2018-10-29 PROCEDURE — 87077 CULTURE AEROBIC IDENTIFY: CPT

## 2018-10-29 PROCEDURE — 85025 COMPLETE CBC W/AUTO DIFF WBC: CPT

## 2018-10-29 PROCEDURE — 86703 HIV-1/HIV-2 1 RESULT ANTBDY: CPT

## 2018-10-30 ENCOUNTER — PATIENT MESSAGE (OUTPATIENT)
Dept: OBSTETRICS AND GYNECOLOGY | Facility: CLINIC | Age: 28
End: 2018-10-30

## 2018-10-30 LAB
BILIRUB UR QL STRIP: NEGATIVE
C TRACH DNA SPEC QL NAA+PROBE: NOT DETECTED
CLARITY UR REFRACT.AUTO: CLEAR
COLOR UR AUTO: YELLOW
GLUCOSE UR QL STRIP: NEGATIVE
HBV SURFACE AG SERPL QL IA: NEGATIVE
HGB UR QL STRIP: NEGATIVE
HIV 1+2 AB+HIV1 P24 AG SERPL QL IA: NEGATIVE
KETONES UR QL STRIP: NEGATIVE
LEUKOCYTE ESTERASE UR QL STRIP: NEGATIVE
N GONORRHOEA DNA SPEC QL NAA+PROBE: NOT DETECTED
NITRITE UR QL STRIP: NEGATIVE
PH UR STRIP: 6 [PH] (ref 5–8)
PROT UR QL STRIP: NEGATIVE
RPR SER QL: NORMAL
RUBV IGG SER-ACNC: 23 IU/ML
RUBV IGG SER-IMP: REACTIVE
SP GR UR STRIP: 1.01 (ref 1–1.03)
URN SPEC COLLECT METH UR: NORMAL

## 2018-11-01 ENCOUNTER — TELEPHONE (OUTPATIENT)
Dept: OBSTETRICS AND GYNECOLOGY | Facility: CLINIC | Age: 28
End: 2018-11-01

## 2018-11-01 RX ORDER — AMPICILLIN 500 MG/1
500 CAPSULE ORAL 3 TIMES DAILY
Qty: 21 CAPSULE | Refills: 0 | Status: SHIPPED | OUTPATIENT
Start: 2018-11-01 | End: 2018-11-26

## 2018-11-01 NOTE — PROGRESS NOTES
SUBJECTIVE:   28 y.o. female   for missed period. Patient's last menstrual period was 2018..  She normally has regular periods.  Not using contraception.  She has no unusual complaints        UPT+  EGA 4  wga  EDC 7    Past Medical History:   Diagnosis Date    Abnormal Pap smear     since , Colpo + HPV    Anemia     Asthma     mild     Past Surgical History:   Procedure Laterality Date    COLPOSCOPY      CYSTOSCOPY  2018    Procedure: CYSTOSCOPY;  Surgeon: Mau Ledesma Jr., MD;  Location: [facility] OR [address] [place] [address];  Service: Urology;;    CYSTOSCOPY  2018    Performed by Mau Ledesma Jr., MD at [facility] OR [address] [place] [address]    CYSTOSCOPY N/A 3/17/2016    Performed by Mau Ledesma Jr., MD at [facility] OR [address] [place] [address]    KIDNEY STONE SURGERY  2015    LITHOTRIPSY-EXTRACORPOREAL SHOCK WAVE/in cysto room 2 Right 3/17/2016    Performed by Mau Ledesma Jr., MD at [address]    PLACEMENT-STENTJJ stent with string Right 3/17/2016    Performed by Mau Ledesma Jr., MD at [facility] OR [address] [place] [address]    right shoulder      SHOULDER SURGERY      right shoulder    THUMB ARTHROSCOPY      thumb surgery    thumb surgery      URETEROSCOPY Left 2018    Procedure: URETEROSCOPY;  Surgeon: Mau Ledesma Jr., MD;  Location: [facility] OR [address] [place] [address];  Service: Urology;  Laterality: Left;  60mins    URETEROSCOPY Left 2018    Performed by Mau Ledesma Jr., MD at [facility] OR [address] [place] [address]     Social History     Socioeconomic History    Marital status: Single     Spouse name: Not on file    Number of children: Not on file    Years of education: Not on file    Highest education level: Not on file   Social Needs    Financial resource strain: Not on file    Food insecurity - worry: Not on file    Food insecurity - inability: Not on file    Transportation needs - medical: Not on file    Transportation needs - non-medical: Not on file   Occupational History    Occupation: [employer]     Tobacco Use     Smoking status: Never Smoker    Smokeless tobacco: Never Used   Substance and Sexual Activity    Alcohol use: Yes     Comment: socially    Drug use: No    Sexual activity: Yes     Partners: Male     Birth control/protection: None   Other Topics Concern    Not on file   Social History Narrative    Not on file     Family History   Problem Relation Age of Onset    Ovarian cancer Paternal Grandmother     Colon cancer Neg Hx     Breast cancer Neg Hx      OB History    Para Term  AB Living   2 1 1     1   SAB TAB Ectopic Multiple Live Births           1      # Outcome Date GA Lbr Oneil/2nd Weight Sex Delivery Anes PTL Lv   2 Current            1 Term 12 37w0d  2.863 kg (6 lb 5 oz) F Vag-Spont EPI  JEVON      Birth Comments: System Generated. Please review and update pregnancy details.            No current outpatient medications on file.     No current facility-administered medications for this visit.      Allergies: Patient has no known allergies.     ROS:  Constitutional: no weight loss, weight gain, fever, fatigue  Eyes:  No vision changes, glasses/contacts  ENT/Mouth: No ulcers, sinus problems, ears ringing, headache  Cardiovascular: No inability to lie flat, chest pain, exercise intolerance, swelling, heart palpitations  Respiratory: No wheezing, coughing blood, shortness of breath, or cough  Gastrointestinal: No diarrhea, bloody stool, nausea/vomiting, constipation, gas, hemorrhoids  Genitourinary: No blood in urine, painful urination, urgency of urination, frequency of urination, incomplete emptying, incontinence, painful periods, heavy periods, vaginal discharge, vaginal odor, painful intercourse, sexual problems, bleeding after intercourse.  Musculoskeletal: No muscle weakness  Skin/Breast: No painful breasts, nipple discharge, masses, rash, ulcers  Neurological: No passing out, seizures, numbness, headache  Endocrine: No diabetes, hypothyroid, hyperthyroid, hot flashes, hair loss,  "abnormal hair growth, ance  Psychiatric: No depression, crying  Hematologic: No bruises, bleeding, swollen lymph nodes, anemia.      OBJECTIVE:   The patient appears well, alert, oriented x 3, in no distress.  /64 (BP Location: Right arm, Patient Position: Sitting, BP Method: Medium (Manual))   Ht 5' 3" (1.6 m)   Wt 70 kg (154 lb 5.2 oz)   LMP 09/27/2018   BMI 27.34 kg/m²   NECK: no thyromegaly, trachea midline  SKIN: no acne, striae, hirsutism  CHEST: CTAB  CV: RRR  BREAST EXAM: breasts appear normal, no suspicious masses, no skin or nipple changes or axillary nodes  ABDOMEN: no hernias, masses, or hepatosplenomegaly  GENITALIA: normal external genitalia, no erythema, no discharge  URETHRA: normal urethra, normal urethral meatus  VAGINA: Normal  CERVIX: no lesions or cervical motion tenderness  UTERUS: normal size, contour, position, consistency, mobility, non-tender  ADNEXA: no mass, fullness, tenderness      ASSESSMENT:   1. Secondary oligomenorrhea  Urinalysis    Urine culture    CBC auto differential    Type & Screen - Ob Profile    Rubella antibody, IgG    HIV-1 and HIV-2 antibodies    Hepatitis B surface antigen    RPR    C. trachomatis/N. gonorrhoeae by AMP DNA    hCG, quantitative   2. Positive pregnancy test  Urinalysis    Urine culture    CBC auto differential    Type & Screen - Ob Profile    Rubella antibody, IgG    HIV-1 and HIV-2 antibodies    Hepatitis B surface antigen    RPR    C. trachomatis/N. gonorrhoeae by AMP DNA    hCG, quantitative   3. Amenorrhea  POCT urine pregnancy       PLAN:   Orders Placed This Encounter    Urine culture    C. trachomatis/N. gonorrhoeae by AMP DNA    Urinalysis    CBC auto differential    Rubella antibody, IgG    HIV-1 and HIV-2 antibodies    Hepatitis B surface antigen    RPR    hCG, quantitative    POCT urine pregnancy    Type & Screen - Ob Profile     Discussed new OB, ER precautions, PNV, diet, OB labs, u/s  RTC 4 weeks  "

## 2018-11-01 NOTE — TELEPHONE ENCOUNTER
----- Message from Roseann Ghosh sent at 11/1/2018  2:34 PM CDT -----  Contact: self  Patient is returning a missed call and can be reached at [phone]

## 2018-11-02 LAB — BACTERIA UR CULT: NORMAL

## 2018-11-13 DIAGNOSIS — R11.0 NAUSEA: ICD-10-CM

## 2018-11-13 RX ORDER — ONDANSETRON 4 MG/1
4 TABLET, FILM COATED ORAL EVERY 4 HOURS PRN
Qty: 30 TABLET | Refills: 1 | Status: CANCELLED | OUTPATIENT
Start: 2018-11-13 | End: 2019-11-13

## 2018-11-13 NOTE — TELEPHONE ENCOUNTER
----- Message from Cecilia Luke sent at 11/13/2018  2:07 PM CST -----  Contact: REINIER FREDERICK [[id number]]            Name of Who is Calling: REINIER FREDERICK [[id number]]      What is the request in detail: patient would like nausea medication sent to [facility] pharmacy. Please call     Can the clinic reply by [facility]: no    What Number to Call Back [facility] not [facility]: [phone]

## 2018-11-14 ENCOUNTER — PATIENT MESSAGE (OUTPATIENT)
Dept: OBSTETRICS AND GYNECOLOGY | Facility: CLINIC | Age: 28
End: 2018-11-14

## 2018-11-14 ENCOUNTER — TELEPHONE (OUTPATIENT)
Dept: OBSTETRICS AND GYNECOLOGY | Facility: CLINIC | Age: 28
End: 2018-11-14

## 2018-11-14 RX ORDER — DOXYLAMINE SUCCINATE AND PYRIDOXINE HYDROCHLORIDE, DELAYED RELEASE TABLETS 10 MG/10 MG 10; 10 MG/1; MG/1
2 TABLET, DELAYED RELEASE ORAL NIGHTLY
Qty: 60 TABLET | Refills: 1 | Status: SHIPPED | OUTPATIENT
Start: 2018-11-14 | End: 2019-01-07 | Stop reason: SDUPTHER

## 2018-11-14 NOTE — TELEPHONE ENCOUNTER
----- Message from Genna Velasquez sent at 11/14/2018  8:23 AM CST -----  Contact: REINIER FREDERICK [[id number]]            Name of Who is Calling: REINIER FREDERICK [[id number]]    What is the request in detail: Patient returned a [place] from the office, please call her back.       Can the clinic reply by [facility]: no      What Number [facility] Back [facility]: [phone]

## 2018-11-26 ENCOUNTER — INITIAL PRENATAL (OUTPATIENT)
Dept: OBSTETRICS AND GYNECOLOGY | Facility: CLINIC | Age: 28
End: 2018-11-26
Attending: OBSTETRICS & GYNECOLOGY
Payer: COMMERCIAL

## 2018-11-26 VITALS
WEIGHT: 157.19 LBS | BODY MASS INDEX: 27.84 KG/M2 | SYSTOLIC BLOOD PRESSURE: 120 MMHG | DIASTOLIC BLOOD PRESSURE: 70 MMHG

## 2018-11-26 DIAGNOSIS — Z34.80 SUPERVISION OF OTHER NORMAL PREGNANCY, ANTEPARTUM: Primary | ICD-10-CM

## 2018-11-26 DIAGNOSIS — N39.0 UTI (URINARY TRACT INFECTION), UNCOMPLICATED: ICD-10-CM

## 2018-11-26 DIAGNOSIS — Z32.01 POSITIVE PREGNANCY TEST: ICD-10-CM

## 2018-11-26 PROCEDURE — 0500F INITIAL PRENATAL CARE VISIT: CPT | Mod: S$GLB,,, | Performed by: OBSTETRICS & GYNECOLOGY

## 2018-11-26 PROCEDURE — 87086 URINE CULTURE/COLONY COUNT: CPT

## 2018-11-26 PROCEDURE — 99999 PR PBB SHADOW E&M-EST. PATIENT-LVL III: CPT | Mod: PBBFAC,,, | Performed by: OBSTETRICS & GYNECOLOGY

## 2018-11-26 PROCEDURE — 81001 URINALYSIS AUTO W/SCOPE: CPT

## 2018-11-26 PROCEDURE — 76801 OB US < 14 WKS SINGLE FETUS: CPT | Mod: S$GLB,,, | Performed by: OBSTETRICS & GYNECOLOGY

## 2018-11-26 RX ORDER — ONDANSETRON 4 MG/1
4 TABLET, FILM COATED ORAL DAILY PRN
Qty: 30 TABLET | Refills: 0 | Status: SHIPPED | OUTPATIENT
Start: 2018-11-26 | End: 2018-12-13 | Stop reason: SDUPTHER

## 2018-11-27 PROBLEM — Z34.80 SUPERVISION OF OTHER NORMAL PREGNANCY, ANTEPARTUM: Status: ACTIVE | Noted: 2018-11-27

## 2018-11-27 LAB
AMORPH CRY UR QL COMP ASSIST: ABNORMAL
BACTERIA #/AREA URNS AUTO: ABNORMAL /HPF
BILIRUB UR QL STRIP: NEGATIVE
CLARITY UR REFRACT.AUTO: ABNORMAL
COLOR UR AUTO: YELLOW
GLUCOSE UR QL STRIP: NEGATIVE
HGB UR QL STRIP: NEGATIVE
KETONES UR QL STRIP: NEGATIVE
LEUKOCYTE ESTERASE UR QL STRIP: NEGATIVE
MICROSCOPIC COMMENT: ABNORMAL
NITRITE UR QL STRIP: NEGATIVE
PH UR STRIP: 6 [PH] (ref 5–8)
PROT UR QL STRIP: NEGATIVE
RBC #/AREA URNS AUTO: 2 /HPF (ref 0–4)
SP GR UR STRIP: 1.01 (ref 1–1.03)
SQUAMOUS #/AREA URNS AUTO: 8 /HPF
URN SPEC COLLECT METH UR: ABNORMAL
WBC #/AREA URNS AUTO: 1 /HPF (ref 0–5)

## 2018-11-27 NOTE — PROGRESS NOTES
Discussed asthma- controlled.  Flu vaccine.  U/A C&S.  COmpleted ABX for UTI.  REviewed OB labs.  Discussed SS- considering.

## 2018-11-29 LAB
BACTERIA UR CULT: NORMAL
BACTERIA UR CULT: NORMAL

## 2018-12-04 ENCOUNTER — PATIENT MESSAGE (OUTPATIENT)
Dept: OBSTETRICS AND GYNECOLOGY | Facility: CLINIC | Age: 28
End: 2018-12-04

## 2018-12-13 RX ORDER — ONDANSETRON 4 MG/1
4 TABLET, FILM COATED ORAL DAILY PRN
Qty: 30 TABLET | Refills: 0 | Status: SHIPPED | OUTPATIENT
Start: 2018-12-13 | End: 2019-02-28 | Stop reason: ALTCHOICE

## 2018-12-13 RX ORDER — ONDANSETRON 4 MG/1
4 TABLET, FILM COATED ORAL DAILY PRN
Qty: 30 TABLET | Refills: 0 | Status: CANCELLED | OUTPATIENT
Start: 2018-12-13 | End: 2019-12-13

## 2018-12-21 ENCOUNTER — TELEPHONE (OUTPATIENT)
Dept: OBSTETRICS AND GYNECOLOGY | Facility: CLINIC | Age: 28
End: 2018-12-21

## 2018-12-21 ENCOUNTER — PATIENT MESSAGE (OUTPATIENT)
Dept: OBSTETRICS AND GYNECOLOGY | Facility: CLINIC | Age: 28
End: 2018-12-21

## 2018-12-21 NOTE — TELEPHONE ENCOUNTER
Received PA request from [facility] pharmacy for Zofran 4 mg tablet. Spoke with patient and she received a letter from insurance to decrease the amount prescribed. Verbal with Teresa (Pharmacist) at [facility] to decrease Zofran 4 mg #10 tablets with 2 refills #20. Covered

## 2018-12-26 ENCOUNTER — ROUTINE PRENATAL (OUTPATIENT)
Dept: OBSTETRICS AND GYNECOLOGY | Facility: CLINIC | Age: 28
End: 2018-12-26
Attending: OBSTETRICS & GYNECOLOGY
Payer: COMMERCIAL

## 2018-12-26 VITALS — BODY MASS INDEX: 28.08 KG/M2 | WEIGHT: 158.5 LBS | SYSTOLIC BLOOD PRESSURE: 120 MMHG | DIASTOLIC BLOOD PRESSURE: 64 MMHG

## 2018-12-26 DIAGNOSIS — Z34.80 SUPERVISION OF OTHER NORMAL PREGNANCY, ANTEPARTUM: Primary | ICD-10-CM

## 2018-12-26 DIAGNOSIS — J45.998 ASTHMA IN REMISSION: ICD-10-CM

## 2018-12-26 PROCEDURE — 99999 PR PBB SHADOW E&M-EST. PATIENT-LVL III: CPT | Mod: PBBFAC,,, | Performed by: OBSTETRICS & GYNECOLOGY

## 2018-12-26 PROCEDURE — 0502F SUBSEQUENT PRENATAL CARE: CPT | Mod: S$GLB,,, | Performed by: OBSTETRICS & GYNECOLOGY

## 2019-01-07 DIAGNOSIS — R11.0 NAUSEA: ICD-10-CM

## 2019-01-07 RX ORDER — DOXYLAMINE SUCCINATE AND PYRIDOXINE HYDROCHLORIDE, DELAYED RELEASE TABLETS 10 MG/10 MG 10; 10 MG/1; MG/1
2 TABLET, DELAYED RELEASE ORAL NIGHTLY
Qty: 60 TABLET | Refills: 0 | Status: SHIPPED | OUTPATIENT
Start: 2019-01-07 | End: 2019-02-08 | Stop reason: SDUPTHER

## 2019-01-21 ENCOUNTER — ROUTINE PRENATAL (OUTPATIENT)
Dept: OBSTETRICS AND GYNECOLOGY | Facility: CLINIC | Age: 29
End: 2019-01-21
Attending: OBSTETRICS & GYNECOLOGY
Payer: COMMERCIAL

## 2019-01-21 VITALS — WEIGHT: 163.13 LBS | DIASTOLIC BLOOD PRESSURE: 60 MMHG | BODY MASS INDEX: 28.9 KG/M2 | SYSTOLIC BLOOD PRESSURE: 110 MMHG

## 2019-01-21 DIAGNOSIS — Z34.80 SUPERVISION OF OTHER NORMAL PREGNANCY, ANTEPARTUM: Primary | ICD-10-CM

## 2019-01-21 PROCEDURE — 99999 PR PBB SHADOW E&M-EST. PATIENT-LVL III: CPT | Mod: PBBFAC,,, | Performed by: OBSTETRICS & GYNECOLOGY

## 2019-01-21 PROCEDURE — 0502F SUBSEQUENT PRENATAL CARE: CPT | Mod: S$GLB,,, | Performed by: OBSTETRICS & GYNECOLOGY

## 2019-01-21 PROCEDURE — 0502F PR SUBSEQUENT PRENATAL CARE: ICD-10-PCS | Mod: S$GLB,,, | Performed by: OBSTETRICS & GYNECOLOGY

## 2019-01-21 PROCEDURE — 99999 PR PBB SHADOW E&M-EST. PATIENT-LVL III: ICD-10-PCS | Mod: PBBFAC,,, | Performed by: OBSTETRICS & GYNECOLOGY

## 2019-02-08 DIAGNOSIS — R11.0 NAUSEA: ICD-10-CM

## 2019-02-08 RX ORDER — DOXYLAMINE SUCCINATE AND PYRIDOXINE HYDROCHLORIDE, DELAYED RELEASE TABLETS 10 MG/10 MG 10; 10 MG/1; MG/1
2 TABLET, DELAYED RELEASE ORAL NIGHTLY
Qty: 60 TABLET | Refills: 0 | Status: SHIPPED | OUTPATIENT
Start: 2019-02-08 | End: 2019-03-18 | Stop reason: SDUPTHER

## 2019-02-18 ENCOUNTER — PROCEDURE VISIT (OUTPATIENT)
Dept: MATERNAL FETAL MEDICINE | Facility: CLINIC | Age: 29
End: 2019-02-18
Attending: OBSTETRICS & GYNECOLOGY
Payer: COMMERCIAL

## 2019-02-18 DIAGNOSIS — Z34.80 SUPERVISION OF OTHER NORMAL PREGNANCY, ANTEPARTUM: ICD-10-CM

## 2019-02-18 DIAGNOSIS — Z36.89 ENCOUNTER FOR FETAL ANATOMIC SURVEY: ICD-10-CM

## 2019-02-18 PROCEDURE — 99499 UNLISTED E&M SERVICE: CPT | Mod: S$GLB,,, | Performed by: OBSTETRICS & GYNECOLOGY

## 2019-02-18 PROCEDURE — 76805 PR US, OB 14+WKS, TRANSABD, SINGLE GESTATION: ICD-10-PCS | Mod: S$GLB,,, | Performed by: OBSTETRICS & GYNECOLOGY

## 2019-02-18 PROCEDURE — 76805 OB US >/= 14 WKS SNGL FETUS: CPT | Mod: S$GLB,,, | Performed by: OBSTETRICS & GYNECOLOGY

## 2019-02-18 PROCEDURE — 99499 NO LOS: ICD-10-PCS | Mod: S$GLB,,, | Performed by: OBSTETRICS & GYNECOLOGY

## 2019-02-19 NOTE — PROGRESS NOTES
Indication  ========    Fetal anatomy survey.    Pregnancy History  ==============    Maternal Lab Tests  Genetic screening declined.      Method  ======    2D Color Doppler, , Voluson E10, Transabdominal ultrasound examination. View: Suboptimal view: limited by fetal position.    Pregnancy  =========    Singh pregnancy. Number of fetuses: 1.    Dating  ======    Cycle: regular cycle  Ultrasound examination on: 2/18/2019  GA by U/S based upon: AC, BPD, Femur, HC  GA by U/S 21 w + 2 d  SERENA by U/S: 6/29/2019  Assigned: Dating performed on 11/26/2018, based on the LMP  Assigned GA 20 w + 4 d  Assigned SERENA: 7/4/2019    General Evaluation  ==============    Cardiac activity: present.  bpm.  Fetal movements: visualized.  Presentation: breech.  Placenta: posterior, low lying (1.5cm away from internal os).  Umbilical cord: placental insertion: suboptimal, normal, 3 vessel cord.  Amniotic fluid: normal amount.    Fetal Biometry  ============    Fetal Biometry  BPD 47.6 mm 20w 3d Hadlock  OFD 69.0 mm 23w 0d Carlos  .6 mm 21w 2d Hadlock  .7 mm 22w 0d Hadlock  Femur 36.9 mm 21w 5d Hadlock  Cerebellum tr 22.2 mm 21w 4d Jeffers  CM 3.4 mm  Humerus 35.3 mm 22w 1d Carlos   g 53% Myles  Calculated by: Hadlock (BPD-HC-AC-FL)  EFW (lb) 1 lb  EFW (oz) 0 oz  Cephalic index 0.69  HC / AC 1.12  FL / BPD 0.78  FL / AC 0.22   bpm  Head / Face / Neck   5.3 mm    Fetal Anatomy  ===========    Cranium: normal  Lateral ventricles: normal  Choroid plexus: normal  Midline falx: normal  Cavum septi pellucidi: normal  Cerebellum: normal  Cisterna magna: normal  Rt lateral ventricle: normal  Lt lateral ventricle: normal  Rt choroid plexus: normal  Lt choroid plexus: normal  Lips: normal  Profile: normal  Nose: normal  RVOT: suboptimal  LVOT: normal  4-chamber view: 4-chamber normal, septum suboptimal  Situs: normal  Aortic arch: normal  3-vessel view: normal  Cardiac position: normal  Cardiac  axis: normal  Cardiac size: normal  Cardiac rhythm: normal  Rt lung: normal  Lt lung: normal  Diaphragm: suboptimal  Cord insertion: normal  Stomach: normal  Kidneys: normal  Bladder: normal  Genitals: normal  Abdom. wall: appears normal  Abdom. cavity: normal  Rt kidney: normal  Lt kidney: normal  Cervical spine: suboptimal  Thoracic spine: suboptimal  Lumbar spine: suboptimal  Sacral spine: suboptimal  Arms: normal  Legs: normal  Rt arm: normal  Lt arm: normal  Rt hand: present  Lt hand: present  Rt leg: normal  Lt leg: normal  Rt foot: normal  Lt foot: normal  Wants to know gender: no    Maternal Structures  ===============    Uterus / Cervix  Uterus: Normal  Cervical length 34.1 mm  Ovaries / Tubes / Adnexa  Rt ovary: Visualized  Lt ovary: Visualized    Impression  =========    A julien living IUP is identified. Fetal size is appropriate for established dating. No fetal structural malformations are identified; however, the  anatomic survey is incomplete as noted above. The patient will be scheduled for a f/u exam to complete the anatomic survey. Cervical length is  normal. The placental location is low-lying with the inferior edge of the placenta approximately 1.5 cm from the internal os but without evidence  of previa. Given the early GA, almost certainly the placental position will migrate from the os as pregnancy advances.    Recommendation  ==============    We recommend repeat evaluation for fetal growth and completion of anatomy survey in 6 weeks with reassessment of placental position at that  time as well.

## 2019-02-20 ENCOUNTER — ROUTINE PRENATAL (OUTPATIENT)
Dept: OBSTETRICS AND GYNECOLOGY | Facility: CLINIC | Age: 29
End: 2019-02-20
Attending: OBSTETRICS & GYNECOLOGY
Payer: COMMERCIAL

## 2019-02-20 VITALS
BODY MASS INDEX: 30.46 KG/M2 | WEIGHT: 171.94 LBS | DIASTOLIC BLOOD PRESSURE: 60 MMHG | SYSTOLIC BLOOD PRESSURE: 110 MMHG

## 2019-02-20 DIAGNOSIS — Z34.80 SUPERVISION OF OTHER NORMAL PREGNANCY, ANTEPARTUM: Primary | ICD-10-CM

## 2019-02-20 PROCEDURE — 0502F PR SUBSEQUENT PRENATAL CARE: ICD-10-PCS | Mod: CPTII,S$GLB,, | Performed by: OBSTETRICS & GYNECOLOGY

## 2019-02-20 PROCEDURE — 0502F SUBSEQUENT PRENATAL CARE: CPT | Mod: CPTII,S$GLB,, | Performed by: OBSTETRICS & GYNECOLOGY

## 2019-02-20 PROCEDURE — 99999 PR PBB SHADOW E&M-EST. PATIENT-LVL III: ICD-10-PCS | Mod: PBBFAC,,, | Performed by: OBSTETRICS & GYNECOLOGY

## 2019-02-20 PROCEDURE — 99999 PR PBB SHADOW E&M-EST. PATIENT-LVL III: CPT | Mod: PBBFAC,,, | Performed by: OBSTETRICS & GYNECOLOGY

## 2019-02-27 ENCOUNTER — PATIENT MESSAGE (OUTPATIENT)
Dept: OBSTETRICS AND GYNECOLOGY | Facility: CLINIC | Age: 29
End: 2019-02-27

## 2019-03-18 DIAGNOSIS — R11.0 NAUSEA: ICD-10-CM

## 2019-03-18 RX ORDER — DOXYLAMINE SUCCINATE AND PYRIDOXINE HYDROCHLORIDE, DELAYED RELEASE TABLETS 10 MG/10 MG 10; 10 MG/1; MG/1
2 TABLET, DELAYED RELEASE ORAL NIGHTLY
Qty: 30 TABLET | Refills: 0 | Status: SHIPPED | OUTPATIENT
Start: 2019-03-18 | End: 2019-04-16 | Stop reason: SDUPTHER

## 2019-03-19 ENCOUNTER — PATIENT MESSAGE (OUTPATIENT)
Dept: OBSTETRICS AND GYNECOLOGY | Facility: CLINIC | Age: 29
End: 2019-03-19

## 2019-03-20 ENCOUNTER — LAB VISIT (OUTPATIENT)
Dept: LAB | Facility: OTHER | Age: 29
End: 2019-03-20
Attending: OBSTETRICS & GYNECOLOGY
Payer: COMMERCIAL

## 2019-03-20 ENCOUNTER — TELEPHONE (OUTPATIENT)
Dept: OBSTETRICS AND GYNECOLOGY | Facility: CLINIC | Age: 29
End: 2019-03-20

## 2019-03-20 ENCOUNTER — ROUTINE PRENATAL (OUTPATIENT)
Dept: OBSTETRICS AND GYNECOLOGY | Facility: CLINIC | Age: 29
End: 2019-03-20
Attending: OBSTETRICS & GYNECOLOGY
Payer: COMMERCIAL

## 2019-03-20 VITALS
BODY MASS INDEX: 31.16 KG/M2 | DIASTOLIC BLOOD PRESSURE: 56 MMHG | SYSTOLIC BLOOD PRESSURE: 110 MMHG | WEIGHT: 175.94 LBS

## 2019-03-20 DIAGNOSIS — Z34.80 SUPERVISION OF OTHER NORMAL PREGNANCY, ANTEPARTUM: ICD-10-CM

## 2019-03-20 DIAGNOSIS — Z34.80 SUPERVISION OF OTHER NORMAL PREGNANCY, ANTEPARTUM: Primary | ICD-10-CM

## 2019-03-20 LAB — GLUCOSE SERPL-MCNC: 163 MG/DL

## 2019-03-20 PROCEDURE — 99999 PR PBB SHADOW E&M-EST. PATIENT-LVL III: ICD-10-PCS | Mod: PBBFAC,,, | Performed by: OBSTETRICS & GYNECOLOGY

## 2019-03-20 PROCEDURE — 0502F PR SUBSEQUENT PRENATAL CARE: ICD-10-PCS | Mod: CPTII,S$GLB,, | Performed by: OBSTETRICS & GYNECOLOGY

## 2019-03-20 PROCEDURE — 36415 COLL VENOUS BLD VENIPUNCTURE: CPT

## 2019-03-20 PROCEDURE — 99999 PR PBB SHADOW E&M-EST. PATIENT-LVL III: CPT | Mod: PBBFAC,,, | Performed by: OBSTETRICS & GYNECOLOGY

## 2019-03-20 PROCEDURE — 82950 GLUCOSE TEST: CPT

## 2019-03-20 PROCEDURE — 0502F SUBSEQUENT PRENATAL CARE: CPT | Mod: CPTII,S$GLB,, | Performed by: OBSTETRICS & GYNECOLOGY

## 2019-03-21 ENCOUNTER — PATIENT MESSAGE (OUTPATIENT)
Dept: OBSTETRICS AND GYNECOLOGY | Facility: CLINIC | Age: 29
End: 2019-03-21

## 2019-03-26 ENCOUNTER — LAB VISIT (OUTPATIENT)
Dept: LAB | Facility: OTHER | Age: 29
End: 2019-03-26
Attending: OBSTETRICS & GYNECOLOGY
Payer: COMMERCIAL

## 2019-03-26 DIAGNOSIS — Z34.80 SUPERVISION OF OTHER NORMAL PREGNANCY, ANTEPARTUM: ICD-10-CM

## 2019-03-26 LAB
GLUCOSE SERPL-MCNC: 127 MG/DL
GLUCOSE SERPL-MCNC: 139 MG/DL
GLUCOSE SERPL-MCNC: 85 MG/DL (ref 70–110)
GLUCOSE SERPL-MCNC: 99 MG/DL

## 2019-03-26 PROCEDURE — 82952 GTT-ADDED SAMPLES: CPT

## 2019-03-26 PROCEDURE — 36415 COLL VENOUS BLD VENIPUNCTURE: CPT

## 2019-03-26 PROCEDURE — 82951 GLUCOSE TOLERANCE TEST (GTT): CPT

## 2019-03-29 ENCOUNTER — PROCEDURE VISIT (OUTPATIENT)
Dept: MATERNAL FETAL MEDICINE | Facility: CLINIC | Age: 29
End: 2019-03-29
Payer: COMMERCIAL

## 2019-03-29 DIAGNOSIS — Z36.89 ENCOUNTER FOR ULTRASOUND TO CHECK FETAL GROWTH: Primary | ICD-10-CM

## 2019-03-29 PROCEDURE — 99499 NO LOS: ICD-10-PCS | Mod: S$GLB,,, | Performed by: OBSTETRICS & GYNECOLOGY

## 2019-03-29 PROCEDURE — 76816 PR  US,PREGNANT UTERUS,F/U,TRANSABD APP: ICD-10-PCS | Mod: S$GLB,,, | Performed by: OBSTETRICS & GYNECOLOGY

## 2019-03-29 PROCEDURE — 76816 OB US FOLLOW-UP PER FETUS: CPT | Mod: S$GLB,,, | Performed by: OBSTETRICS & GYNECOLOGY

## 2019-03-29 PROCEDURE — 99499 UNLISTED E&M SERVICE: CPT | Mod: S$GLB,,, | Performed by: OBSTETRICS & GYNECOLOGY

## 2019-04-11 ENCOUNTER — ROUTINE PRENATAL (OUTPATIENT)
Dept: OBSTETRICS AND GYNECOLOGY | Facility: CLINIC | Age: 29
End: 2019-04-11
Attending: OBSTETRICS & GYNECOLOGY
Payer: COMMERCIAL

## 2019-04-11 VITALS — DIASTOLIC BLOOD PRESSURE: 70 MMHG | SYSTOLIC BLOOD PRESSURE: 120 MMHG | BODY MASS INDEX: 30.7 KG/M2 | WEIGHT: 173.31 LBS

## 2019-04-11 DIAGNOSIS — J45.998 ASTHMA IN REMISSION: ICD-10-CM

## 2019-04-11 DIAGNOSIS — Z34.80 SUPERVISION OF OTHER NORMAL PREGNANCY, ANTEPARTUM: Primary | ICD-10-CM

## 2019-04-11 PROCEDURE — 0502F SUBSEQUENT PRENATAL CARE: CPT | Mod: CPTII,S$GLB,, | Performed by: OBSTETRICS & GYNECOLOGY

## 2019-04-11 PROCEDURE — 99999 PR PBB SHADOW E&M-EST. PATIENT-LVL III: CPT | Mod: PBBFAC,,, | Performed by: OBSTETRICS & GYNECOLOGY

## 2019-04-11 PROCEDURE — 0502F PR SUBSEQUENT PRENATAL CARE: ICD-10-PCS | Mod: CPTII,S$GLB,, | Performed by: OBSTETRICS & GYNECOLOGY

## 2019-04-11 PROCEDURE — 99999 PR PBB SHADOW E&M-EST. PATIENT-LVL III: ICD-10-PCS | Mod: PBBFAC,,, | Performed by: OBSTETRICS & GYNECOLOGY

## 2019-04-16 DIAGNOSIS — R11.0 NAUSEA: ICD-10-CM

## 2019-04-17 RX ORDER — DOXYLAMINE SUCCINATE AND PYRIDOXINE HYDROCHLORIDE, DELAYED RELEASE TABLETS 10 MG/10 MG 10; 10 MG/1; MG/1
2 TABLET, DELAYED RELEASE ORAL NIGHTLY
Qty: 30 TABLET | Refills: 0 | Status: SHIPPED | OUTPATIENT
Start: 2019-04-17 | End: 2019-05-16 | Stop reason: SDUPTHER

## 2019-05-02 ENCOUNTER — ROUTINE PRENATAL (OUTPATIENT)
Dept: OBSTETRICS AND GYNECOLOGY | Facility: CLINIC | Age: 29
End: 2019-05-02
Attending: OBSTETRICS & GYNECOLOGY
Payer: COMMERCIAL

## 2019-05-02 VITALS — DIASTOLIC BLOOD PRESSURE: 80 MMHG | BODY MASS INDEX: 30.7 KG/M2 | SYSTOLIC BLOOD PRESSURE: 140 MMHG | WEIGHT: 173.31 LBS

## 2019-05-02 DIAGNOSIS — Z34.80 SUPERVISION OF OTHER NORMAL PREGNANCY, ANTEPARTUM: Primary | ICD-10-CM

## 2019-05-02 PROCEDURE — 0502F SUBSEQUENT PRENATAL CARE: CPT | Mod: CPTII,S$GLB,, | Performed by: OBSTETRICS & GYNECOLOGY

## 2019-05-02 PROCEDURE — 0502F PR SUBSEQUENT PRENATAL CARE: ICD-10-PCS | Mod: CPTII,S$GLB,, | Performed by: OBSTETRICS & GYNECOLOGY

## 2019-05-02 PROCEDURE — 99999 PR PBB SHADOW E&M-EST. PATIENT-LVL III: ICD-10-PCS | Mod: PBBFAC,,, | Performed by: OBSTETRICS & GYNECOLOGY

## 2019-05-02 PROCEDURE — 99999 PR PBB SHADOW E&M-EST. PATIENT-LVL III: CPT | Mod: PBBFAC,,, | Performed by: OBSTETRICS & GYNECOLOGY

## 2019-05-03 ENCOUNTER — TELEPHONE (OUTPATIENT)
Dept: OBSTETRICS AND GYNECOLOGY | Facility: CLINIC | Age: 29
End: 2019-05-03

## 2019-05-03 ENCOUNTER — PATIENT MESSAGE (OUTPATIENT)
Dept: OBSTETRICS AND GYNECOLOGY | Facility: CLINIC | Age: 29
End: 2019-05-03

## 2019-05-03 NOTE — TELEPHONE ENCOUNTER
31 Weeks pregnant reports cramping here and there today and feeling pressure. She notice a jelly-like glob, denies bleeding. Provider recommend if patient desires to be examine she can go to OB/ED or monitor symptoms. Melissa states she will monitor w/ ED options over the weekend. Will call the office Monday if symptoms persist. Advise to increase fluids and rest. She verbalized understanding

## 2019-05-06 ENCOUNTER — PROCEDURE VISIT (OUTPATIENT)
Dept: MATERNAL FETAL MEDICINE | Facility: CLINIC | Age: 29
End: 2019-05-06
Payer: COMMERCIAL

## 2019-05-06 DIAGNOSIS — Z36.89 ENCOUNTER FOR ULTRASOUND TO CHECK FETAL GROWTH: ICD-10-CM

## 2019-05-06 PROCEDURE — 99499 UNLISTED E&M SERVICE: CPT | Mod: S$GLB,,, | Performed by: PEDIATRICS

## 2019-05-06 PROCEDURE — 76816 PR  US,PREGNANT UTERUS,F/U,TRANSABD APP: ICD-10-PCS | Mod: S$GLB,,, | Performed by: PEDIATRICS

## 2019-05-06 PROCEDURE — 99499 NO LOS: ICD-10-PCS | Mod: S$GLB,,, | Performed by: PEDIATRICS

## 2019-05-06 PROCEDURE — 76816 OB US FOLLOW-UP PER FETUS: CPT | Mod: S$GLB,,, | Performed by: PEDIATRICS

## 2019-05-07 ENCOUNTER — PATIENT MESSAGE (OUTPATIENT)
Dept: OBSTETRICS AND GYNECOLOGY | Facility: CLINIC | Age: 29
End: 2019-05-07

## 2019-05-07 NOTE — PROGRESS NOTES
Indication  ========    Follow-up evaluation for fetal growth. Follow-up evaluation for placenta previa.    Pregnancy History  ==============    Maternal Lab Tests  Genetic screening declined.      Method  ======    Transabdominal ultrasound examination, 2D Color Doppler, Voluson E10. View: Good view.    Pregnancy  =========    Singh pregnancy. Number of fetuses: 1.    Dating  ======    Cycle: regular cycle  Ultrasound examination on: 5/6/2019  GA by U/S based upon: AC, BPD, Femur, HC  GA by U/S 31 w + 5 d  SERENA by U/S: 7/3/2019  Assigned: Dating performed on 11/26/2018, based on the LMP  Assigned GA 31 w + 4 d  Assigned SERENA: 7/4/2019    General Evaluation  ==============    Cardiac activity: present.  bpm.  Fetal movements: visualized.  Presentation: cephalic.  Placenta:  Placental site: posterior. Distance from internal cervical os 40 mm.  Umbilical cord: Cord vessels: 3 vessel cord.  Amniotic fluid: MVP 5.1 cm.    Fetal Biometry  ============    Fetal Biometry  BPD 72.7 mm 29w 1d Hadlock  .0 mm 35w 2d Carlos  .5 mm 31w 5d Hadlock  .1 mm 33w 1d Hadlock  Femur 63.0 mm 32w 4d Hadlock  EFW 1,977 g 42% Myles  Calculated by: Hadlock (BPD-HC-AC-FL)  EFW (lb) 4 lb  EFW (oz) 6 oz  Cephalic index 0.68  HC / AC 0.99  FL / BPD 0.87  FL / AC 0.22  MVP 5.1 cm   bpm  Head / Face / Neck   2.6 mm    Fetal Anatomy  ===========    Cranium: appears normal  4-chamber view: normal  Stomach: normal  Kidneys: normal  Bladder: normal  Gender: female  Wants to know gender: yes  Other: A full anatomic survey has been previously performed.    Maternal Structures  ===============    Uterus / Cervix  Cervical length 32.2 mm    Impression  =========    The fetal anatomic survey was completed today, and no fetal structural abnormalities were noted.  Interval fetal growth has been normal, and the AFV is normal.    F/U as clinically indicated.      Recommendation  ==============    Repeat ultrasound study  as clinically indicated    Thank you again for allowing us to participate in the care of your patients. If you have any questions concerning today's consultation, feel free  to contact me or one of my partners. We can be reached at [phone] during normal business hours. If you have a question after normal  business hours, please contact Labor and Delivery at [phone].

## 2019-05-15 ENCOUNTER — CLINICAL SUPPORT (OUTPATIENT)
Dept: OBSTETRICS AND GYNECOLOGY | Facility: CLINIC | Age: 29
End: 2019-05-15
Attending: OBSTETRICS & GYNECOLOGY
Payer: COMMERCIAL

## 2019-05-15 VITALS — WEIGHT: 176.13 LBS | BODY MASS INDEX: 31.2 KG/M2 | SYSTOLIC BLOOD PRESSURE: 120 MMHG | DIASTOLIC BLOOD PRESSURE: 80 MMHG

## 2019-05-15 DIAGNOSIS — Z34.80 SUPERVISION OF OTHER NORMAL PREGNANCY, ANTEPARTUM: Primary | ICD-10-CM

## 2019-05-15 DIAGNOSIS — J45.998 ASTHMA IN REMISSION: ICD-10-CM

## 2019-05-15 PROCEDURE — 0502F PR SUBSEQUENT PRENATAL CARE: ICD-10-PCS | Mod: CPTII,S$GLB,, | Performed by: OBSTETRICS & GYNECOLOGY

## 2019-05-15 PROCEDURE — 90471 TDAP VACCINE GREATER THAN OR EQUAL TO 7YO IM: ICD-10-PCS | Mod: S$GLB,,, | Performed by: OBSTETRICS & GYNECOLOGY

## 2019-05-15 PROCEDURE — 0502F SUBSEQUENT PRENATAL CARE: CPT | Mod: CPTII,S$GLB,, | Performed by: OBSTETRICS & GYNECOLOGY

## 2019-05-15 PROCEDURE — 99999 PR PBB SHADOW E&M-EST. PATIENT-LVL III: ICD-10-PCS | Mod: PBBFAC,,, | Performed by: OBSTETRICS & GYNECOLOGY

## 2019-05-15 PROCEDURE — 99999 PR PBB SHADOW E&M-EST. PATIENT-LVL I: ICD-10-PCS | Mod: PBBFAC,,,

## 2019-05-15 PROCEDURE — 90471 IMMUNIZATION ADMIN: CPT | Mod: S$GLB,,, | Performed by: OBSTETRICS & GYNECOLOGY

## 2019-05-15 PROCEDURE — 90715 TDAP VACCINE 7 YRS/> IM: CPT | Mod: S$GLB,,, | Performed by: OBSTETRICS & GYNECOLOGY

## 2019-05-15 PROCEDURE — 99999 PR PBB SHADOW E&M-EST. PATIENT-LVL III: CPT | Mod: PBBFAC,,, | Performed by: OBSTETRICS & GYNECOLOGY

## 2019-05-15 PROCEDURE — 99999 PR PBB SHADOW E&M-EST. PATIENT-LVL I: CPT | Mod: PBBFAC,,,

## 2019-05-15 PROCEDURE — 90715 TDAP VACCINE GREATER THAN OR EQUAL TO 7YO IM: ICD-10-PCS | Mod: S$GLB,,, | Performed by: OBSTETRICS & GYNECOLOGY

## 2019-05-15 NOTE — PROGRESS NOTES
Here for TDAP injection. Patient without complaint of pain at this time ,Injection given. Tolerated well. No pain noted after injection.  Advised to wait in lobby 15 minutes and report any adverse reactions.     Site: LD    Baby Friendly Handout Given to Patient

## 2019-05-16 DIAGNOSIS — R11.0 NAUSEA: ICD-10-CM

## 2019-05-17 RX ORDER — DOXYLAMINE SUCCINATE AND PYRIDOXINE HYDROCHLORIDE, DELAYED RELEASE TABLETS 10 MG/10 MG 10; 10 MG/1; MG/1
2 TABLET, DELAYED RELEASE ORAL NIGHTLY
Qty: 30 TABLET | Refills: 0 | Status: SHIPPED | OUTPATIENT
Start: 2019-05-17 | End: 2019-06-17 | Stop reason: SDUPTHER

## 2019-05-30 ENCOUNTER — LAB VISIT (OUTPATIENT)
Dept: LAB | Facility: OTHER | Age: 29
End: 2019-05-30
Attending: OBSTETRICS & GYNECOLOGY
Payer: COMMERCIAL

## 2019-05-30 ENCOUNTER — ROUTINE PRENATAL (OUTPATIENT)
Dept: OBSTETRICS AND GYNECOLOGY | Facility: CLINIC | Age: 29
End: 2019-05-30
Attending: OBSTETRICS & GYNECOLOGY
Payer: COMMERCIAL

## 2019-05-30 VITALS — DIASTOLIC BLOOD PRESSURE: 72 MMHG | SYSTOLIC BLOOD PRESSURE: 124 MMHG

## 2019-05-30 DIAGNOSIS — Z34.80 SUPERVISION OF OTHER NORMAL PREGNANCY, ANTEPARTUM: ICD-10-CM

## 2019-05-30 DIAGNOSIS — Z34.80 SUPERVISION OF OTHER NORMAL PREGNANCY, ANTEPARTUM: Primary | ICD-10-CM

## 2019-05-30 LAB
BASOPHILS # BLD AUTO: 0.02 K/UL (ref 0–0.2)
BASOPHILS NFR BLD: 0.2 % (ref 0–1.9)
DIFFERENTIAL METHOD: ABNORMAL
EOSINOPHIL # BLD AUTO: 0.5 K/UL (ref 0–0.5)
EOSINOPHIL NFR BLD: 4.6 % (ref 0–8)
ERYTHROCYTE [DISTWIDTH] IN BLOOD BY AUTOMATED COUNT: 13.6 % (ref 11.5–14.5)
HCT VFR BLD AUTO: 28 % (ref 37–48.5)
HGB BLD-MCNC: 9.5 G/DL (ref 12–16)
LYMPHOCYTES # BLD AUTO: 1.4 K/UL (ref 1–4.8)
LYMPHOCYTES NFR BLD: 14.3 % (ref 18–48)
MCH RBC QN AUTO: 29.1 PG (ref 27–31)
MCHC RBC AUTO-ENTMCNC: 33.9 G/DL (ref 32–36)
MCV RBC AUTO: 86 FL (ref 82–98)
MONOCYTES # BLD AUTO: 0.8 K/UL (ref 0.3–1)
MONOCYTES NFR BLD: 8.4 % (ref 4–15)
NEUTROPHILS # BLD AUTO: 7.1 K/UL (ref 1.8–7.7)
NEUTROPHILS NFR BLD: 72.1 % (ref 38–73)
PLATELET # BLD AUTO: 372 K/UL (ref 150–350)
PMV BLD AUTO: 10.1 FL (ref 9.2–12.9)
RBC # BLD AUTO: 3.27 M/UL (ref 4–5.4)
WBC # BLD AUTO: 9.87 K/UL (ref 3.9–12.7)

## 2019-05-30 PROCEDURE — 36415 COLL VENOUS BLD VENIPUNCTURE: CPT

## 2019-05-30 PROCEDURE — 87081 CULTURE SCREEN ONLY: CPT

## 2019-05-30 PROCEDURE — 86592 SYPHILIS TEST NON-TREP QUAL: CPT

## 2019-05-30 PROCEDURE — 85025 COMPLETE CBC W/AUTO DIFF WBC: CPT

## 2019-05-30 PROCEDURE — 0502F PR SUBSEQUENT PRENATAL CARE: ICD-10-PCS | Mod: CPTII,S$GLB,, | Performed by: OBSTETRICS & GYNECOLOGY

## 2019-05-30 PROCEDURE — 86703 HIV-1/HIV-2 1 RESULT ANTBDY: CPT

## 2019-05-30 PROCEDURE — 99999 PR PBB SHADOW E&M-EST. PATIENT-LVL III: ICD-10-PCS | Mod: PBBFAC,,, | Performed by: OBSTETRICS & GYNECOLOGY

## 2019-05-30 PROCEDURE — 99999 PR PBB SHADOW E&M-EST. PATIENT-LVL III: CPT | Mod: PBBFAC,,, | Performed by: OBSTETRICS & GYNECOLOGY

## 2019-05-30 PROCEDURE — 0502F SUBSEQUENT PRENATAL CARE: CPT | Mod: CPTII,S$GLB,, | Performed by: OBSTETRICS & GYNECOLOGY

## 2019-05-31 ENCOUNTER — PATIENT MESSAGE (OUTPATIENT)
Dept: OBSTETRICS AND GYNECOLOGY | Facility: CLINIC | Age: 29
End: 2019-05-31

## 2019-05-31 LAB
HIV 1+2 AB+HIV1 P24 AG SERPL QL IA: NEGATIVE
RPR SER QL: NORMAL

## 2019-06-01 LAB — BACTERIA SPEC AEROBE CULT: NORMAL

## 2019-06-13 ENCOUNTER — ROUTINE PRENATAL (OUTPATIENT)
Dept: OBSTETRICS AND GYNECOLOGY | Facility: CLINIC | Age: 29
End: 2019-06-13
Attending: OBSTETRICS & GYNECOLOGY
Payer: COMMERCIAL

## 2019-06-13 VITALS
DIASTOLIC BLOOD PRESSURE: 70 MMHG | SYSTOLIC BLOOD PRESSURE: 130 MMHG | WEIGHT: 176.56 LBS | BODY MASS INDEX: 31.28 KG/M2

## 2019-06-13 DIAGNOSIS — Z34.80 SUPERVISION OF OTHER NORMAL PREGNANCY, ANTEPARTUM: Primary | ICD-10-CM

## 2019-06-13 PROCEDURE — 0502F PR SUBSEQUENT PRENATAL CARE: ICD-10-PCS | Mod: CPTII,S$GLB,, | Performed by: OBSTETRICS & GYNECOLOGY

## 2019-06-13 PROCEDURE — 99999 PR PBB SHADOW E&M-EST. PATIENT-LVL III: ICD-10-PCS | Mod: PBBFAC,,, | Performed by: OBSTETRICS & GYNECOLOGY

## 2019-06-13 PROCEDURE — 0502F SUBSEQUENT PRENATAL CARE: CPT | Mod: CPTII,S$GLB,, | Performed by: OBSTETRICS & GYNECOLOGY

## 2019-06-13 PROCEDURE — 99999 PR PBB SHADOW E&M-EST. PATIENT-LVL III: CPT | Mod: PBBFAC,,, | Performed by: OBSTETRICS & GYNECOLOGY

## 2019-06-17 DIAGNOSIS — R11.0 NAUSEA: ICD-10-CM

## 2019-06-17 RX ORDER — DOXYLAMINE SUCCINATE AND PYRIDOXINE HYDROCHLORIDE, DELAYED RELEASE TABLETS 10 MG/10 MG 10; 10 MG/1; MG/1
2 TABLET, DELAYED RELEASE ORAL NIGHTLY
Qty: 30 TABLET | Refills: 0 | Status: ON HOLD | OUTPATIENT
Start: 2019-06-17 | End: 2019-06-29 | Stop reason: HOSPADM

## 2019-06-20 ENCOUNTER — ROUTINE PRENATAL (OUTPATIENT)
Dept: OBSTETRICS AND GYNECOLOGY | Facility: CLINIC | Age: 29
End: 2019-06-20
Attending: OBSTETRICS & GYNECOLOGY
Payer: COMMERCIAL

## 2019-06-20 VITALS
SYSTOLIC BLOOD PRESSURE: 122 MMHG | BODY MASS INDEX: 31.87 KG/M2 | WEIGHT: 179.88 LBS | DIASTOLIC BLOOD PRESSURE: 72 MMHG

## 2019-06-20 DIAGNOSIS — J45.998 ASTHMA IN REMISSION: ICD-10-CM

## 2019-06-20 DIAGNOSIS — Z34.80 SUPERVISION OF OTHER NORMAL PREGNANCY, ANTEPARTUM: Primary | ICD-10-CM

## 2019-06-20 PROCEDURE — 0502F SUBSEQUENT PRENATAL CARE: CPT | Mod: CPTII,S$GLB,, | Performed by: OBSTETRICS & GYNECOLOGY

## 2019-06-20 PROCEDURE — 99999 PR PBB SHADOW E&M-EST. PATIENT-LVL III: CPT | Mod: PBBFAC,,, | Performed by: OBSTETRICS & GYNECOLOGY

## 2019-06-20 PROCEDURE — 0502F PR SUBSEQUENT PRENATAL CARE: ICD-10-PCS | Mod: CPTII,S$GLB,, | Performed by: OBSTETRICS & GYNECOLOGY

## 2019-06-20 PROCEDURE — 99999 PR PBB SHADOW E&M-EST. PATIENT-LVL III: ICD-10-PCS | Mod: PBBFAC,,, | Performed by: OBSTETRICS & GYNECOLOGY

## 2019-06-24 ENCOUNTER — TELEPHONE (OUTPATIENT)
Dept: OBSTETRICS AND GYNECOLOGY | Facility: CLINIC | Age: 29
End: 2019-06-24

## 2019-06-24 ENCOUNTER — PATIENT MESSAGE (OUTPATIENT)
Dept: OBSTETRICS AND GYNECOLOGY | Facility: CLINIC | Age: 29
End: 2019-06-24

## 2019-06-24 ENCOUNTER — ROUTINE PRENATAL (OUTPATIENT)
Dept: OBSTETRICS AND GYNECOLOGY | Facility: CLINIC | Age: 29
End: 2019-06-24
Attending: OBSTETRICS & GYNECOLOGY
Payer: COMMERCIAL

## 2019-06-24 VITALS
WEIGHT: 178.56 LBS | DIASTOLIC BLOOD PRESSURE: 80 MMHG | BODY MASS INDEX: 31.63 KG/M2 | SYSTOLIC BLOOD PRESSURE: 126 MMHG

## 2019-06-24 DIAGNOSIS — Z34.80 SUPERVISION OF OTHER NORMAL PREGNANCY, ANTEPARTUM: Primary | ICD-10-CM

## 2019-06-24 PROCEDURE — 99999 PR PBB SHADOW E&M-EST. PATIENT-LVL III: CPT | Mod: PBBFAC,,, | Performed by: OBSTETRICS & GYNECOLOGY

## 2019-06-24 PROCEDURE — 0502F SUBSEQUENT PRENATAL CARE: CPT | Mod: CPTII,S$GLB,, | Performed by: OBSTETRICS & GYNECOLOGY

## 2019-06-24 PROCEDURE — 0502F PR SUBSEQUENT PRENATAL CARE: ICD-10-PCS | Mod: CPTII,S$GLB,, | Performed by: OBSTETRICS & GYNECOLOGY

## 2019-06-24 PROCEDURE — 99999 PR PBB SHADOW E&M-EST. PATIENT-LVL III: ICD-10-PCS | Mod: PBBFAC,,, | Performed by: OBSTETRICS & GYNECOLOGY

## 2019-06-24 NOTE — TELEPHONE ENCOUNTER
38 weeks sent a portal message reporting cramping, pressure and sharp pains. Provider recommend an appointment today to be evaluated.

## 2019-06-26 ENCOUNTER — HOSPITAL ENCOUNTER (INPATIENT)
Facility: OTHER | Age: 29
LOS: 2 days | Discharge: HOME | End: 2019-06-29
Attending: OBSTETRICS & GYNECOLOGY | Admitting: OBSTETRICS & GYNECOLOGY
Payer: COMMERCIAL

## 2019-06-26 DIAGNOSIS — Z3A.39 39 WEEKS GESTATION OF PREGNANCY: ICD-10-CM

## 2019-06-26 DIAGNOSIS — O36.8390 FETAL HEART RATE DECELERATIONS AFFECTING MANAGEMENT OF MOTHER: Primary | ICD-10-CM

## 2019-06-26 DIAGNOSIS — O47.9 UTERINE CONTRACTIONS DURING PREGNANCY: ICD-10-CM

## 2019-06-26 PROCEDURE — 99285 EMERGENCY DEPT VISIT HI MDM: CPT | Mod: 25

## 2019-06-26 PROCEDURE — 59025 FETAL NON-STRESS TEST: CPT

## 2019-06-27 ENCOUNTER — ANESTHESIA (OUTPATIENT)
Dept: OBSTETRICS AND GYNECOLOGY | Facility: OTHER | Age: 29
End: 2019-06-27
Payer: COMMERCIAL

## 2019-06-27 ENCOUNTER — ANESTHESIA EVENT (OUTPATIENT)
Dept: OBSTETRICS AND GYNECOLOGY | Facility: OTHER | Age: 29
End: 2019-06-27
Payer: COMMERCIAL

## 2019-06-27 PROBLEM — Z3A.39 39 WEEKS GESTATION OF PREGNANCY: Status: ACTIVE | Noted: 2019-06-27

## 2019-06-27 PROBLEM — E66.9 OBESITY (BMI 30-39.9): Status: ACTIVE | Noted: 2019-06-27

## 2019-06-27 PROBLEM — O36.8390 FETAL HEART RATE DECELERATIONS AFFECTING MANAGEMENT OF MOTHER: Status: RESOLVED | Noted: 2019-06-27 | Resolved: 2019-06-27

## 2019-06-27 PROBLEM — E66.9 OBESITY (BMI 30-39.9): Status: RESOLVED | Noted: 2019-06-27 | Resolved: 2019-06-27

## 2019-06-27 PROBLEM — Z3A.39 39 WEEKS GESTATION OF PREGNANCY: Status: RESOLVED | Noted: 2019-06-27 | Resolved: 2019-06-27

## 2019-06-27 PROBLEM — O36.8390 FETAL HEART RATE DECELERATIONS AFFECTING MANAGEMENT OF MOTHER: Status: ACTIVE | Noted: 2019-06-27

## 2019-06-27 LAB
ABO + RH BLD: NORMAL
BASOPHILS # BLD AUTO: 0.01 K/UL (ref 0–0.2)
BASOPHILS NFR BLD: 0.1 % (ref 0–1.9)
BLD GP AB SCN CELLS X3 SERPL QL: NORMAL
DIFFERENTIAL METHOD: ABNORMAL
EOSINOPHIL # BLD AUTO: 0.3 K/UL (ref 0–0.5)
EOSINOPHIL NFR BLD: 2.2 % (ref 0–8)
ERYTHROCYTE [DISTWIDTH] IN BLOOD BY AUTOMATED COUNT: 14.5 % (ref 11.5–14.5)
HCT VFR BLD AUTO: 29.5 % (ref 37–48.5)
HGB BLD-MCNC: 10.3 G/DL (ref 12–16)
LYMPHOCYTES # BLD AUTO: 1.9 K/UL (ref 1–4.8)
LYMPHOCYTES NFR BLD: 14.8 % (ref 18–48)
MCH RBC QN AUTO: 29.9 PG (ref 27–31)
MCHC RBC AUTO-ENTMCNC: 34.9 G/DL (ref 32–36)
MCV RBC AUTO: 86 FL (ref 82–98)
MONOCYTES # BLD AUTO: 0.9 K/UL (ref 0.3–1)
MONOCYTES NFR BLD: 7.3 % (ref 4–15)
NEUTROPHILS # BLD AUTO: 9.4 K/UL (ref 1.8–7.7)
NEUTROPHILS NFR BLD: 75.6 % (ref 38–73)
PLATELET # BLD AUTO: 396 K/UL (ref 150–350)
PMV BLD AUTO: 9.8 FL (ref 9.2–12.9)
RBC # BLD AUTO: 3.45 M/UL (ref 4–5.4)
WBC # BLD AUTO: 12.54 K/UL (ref 3.9–12.7)

## 2019-06-27 PROCEDURE — 99284 PR EMERGENCY DEPT VISIT,LEVEL IV: ICD-10-PCS | Mod: 25,,, | Performed by: OBSTETRICS & GYNECOLOGY

## 2019-06-27 PROCEDURE — 27800517 HC TRAY,EPIDURAL-CONTINUOUS: Performed by: STUDENT IN AN ORGANIZED HEALTH CARE EDUCATION/TRAINING PROGRAM

## 2019-06-27 PROCEDURE — 11000001 HC ACUTE MED/SURG PRIVATE ROOM

## 2019-06-27 PROCEDURE — 25000003 PHARM REV CODE 250: Performed by: STUDENT IN AN ORGANIZED HEALTH CARE EDUCATION/TRAINING PROGRAM

## 2019-06-27 PROCEDURE — 99284 EMERGENCY DEPT VISIT MOD MDM: CPT | Mod: 25,,, | Performed by: OBSTETRICS & GYNECOLOGY

## 2019-06-27 PROCEDURE — 59025 FETAL NON-STRESS TEST: CPT | Mod: 26,,, | Performed by: OBSTETRICS & GYNECOLOGY

## 2019-06-27 PROCEDURE — 59400 OBSTETRICAL CARE: CPT | Mod: QY,,, | Performed by: ANESTHESIOLOGY

## 2019-06-27 PROCEDURE — 27200710 HC EPIDURAL INFUSION PUMP SET: Performed by: STUDENT IN AN ORGANIZED HEALTH CARE EDUCATION/TRAINING PROGRAM

## 2019-06-27 PROCEDURE — 51702 INSERT TEMP BLADDER CATH: CPT

## 2019-06-27 PROCEDURE — 59400 PR FULL ROUT OBSTE CARE,VAGINAL DELIV: ICD-10-PCS | Mod: GB,,, | Performed by: OBSTETRICS & GYNECOLOGY

## 2019-06-27 PROCEDURE — 72100003 HC LABOR CARE, EA. ADDL. 8 HRS

## 2019-06-27 PROCEDURE — 63600175 PHARM REV CODE 636 W HCPCS: Performed by: STUDENT IN AN ORGANIZED HEALTH CARE EDUCATION/TRAINING PROGRAM

## 2019-06-27 PROCEDURE — 72200005 HC VAGINAL DELIVERY LEVEL II

## 2019-06-27 PROCEDURE — 59025 PR FETAL 2N-STRESS TEST: ICD-10-PCS | Mod: 26,,, | Performed by: OBSTETRICS & GYNECOLOGY

## 2019-06-27 PROCEDURE — 72100002 HC LABOR CARE, 1ST 8 HOURS

## 2019-06-27 PROCEDURE — 59400 PRA FULL ROUT OBSTE CARE,VAGINAL DELIV: ICD-10-PCS | Mod: QY,,, | Performed by: ANESTHESIOLOGY

## 2019-06-27 PROCEDURE — 59400 OBSTETRICAL CARE: CPT | Mod: GB,,, | Performed by: OBSTETRICS & GYNECOLOGY

## 2019-06-27 PROCEDURE — 86850 RBC ANTIBODY SCREEN: CPT

## 2019-06-27 PROCEDURE — 62326 NJX INTERLAMINAR LMBR/SAC: CPT | Performed by: STUDENT IN AN ORGANIZED HEALTH CARE EDUCATION/TRAINING PROGRAM

## 2019-06-27 PROCEDURE — 85025 COMPLETE CBC W/AUTO DIFF WBC: CPT

## 2019-06-27 RX ORDER — CARBOPROST TROMETHAMINE 250 UG/ML
250 INJECTION, SOLUTION INTRAMUSCULAR
Status: DISCONTINUED | OUTPATIENT
Start: 2019-06-27 | End: 2019-06-29 | Stop reason: HOSPADM

## 2019-06-27 RX ORDER — OXYTOCIN/RINGER'S LACTATE 20/1000 ML
41.65 PLASTIC BAG, INJECTION (ML) INTRAVENOUS CONTINUOUS
Status: ACTIVE | OUTPATIENT
Start: 2019-06-27 | End: 2019-06-27

## 2019-06-27 RX ORDER — SODIUM CHLORIDE, SODIUM LACTATE, POTASSIUM CHLORIDE, CALCIUM CHLORIDE 600; 310; 30; 20 MG/100ML; MG/100ML; MG/100ML; MG/100ML
INJECTION, SOLUTION INTRAVENOUS CONTINUOUS
Status: DISCONTINUED | OUTPATIENT
Start: 2019-06-27 | End: 2019-06-29 | Stop reason: HOSPADM

## 2019-06-27 RX ORDER — DOCUSATE SODIUM 100 MG/1
200 CAPSULE, LIQUID FILLED ORAL 2 TIMES DAILY PRN
Status: DISCONTINUED | OUTPATIENT
Start: 2019-06-27 | End: 2019-06-29 | Stop reason: HOSPADM

## 2019-06-27 RX ORDER — FENTANYL/BUPIVACAINE/NS/PF 2MCG/ML-.1
PLASTIC BAG, INJECTION (ML) INJECTION CONTINUOUS PRN
Status: DISCONTINUED | OUTPATIENT
Start: 2019-06-27 | End: 2019-06-27

## 2019-06-27 RX ORDER — MISOPROSTOL 200 UG/1
600 TABLET ORAL
Status: DISCONTINUED | OUTPATIENT
Start: 2019-06-27 | End: 2019-06-29 | Stop reason: HOSPADM

## 2019-06-27 RX ORDER — FENTANYL/BUPIVACAINE/NS/PF 2MCG/ML-.1
PLASTIC BAG, INJECTION (ML) INJECTION
Status: DISPENSED
Start: 2019-06-27 | End: 2019-06-27

## 2019-06-27 RX ORDER — OXYTOCIN/RINGER'S LACTATE 20/1000 ML
20 PLASTIC BAG, INJECTION (ML) INTRAVENOUS ONCE
Status: DISCONTINUED | OUTPATIENT
Start: 2019-06-27 | End: 2019-06-29 | Stop reason: HOSPADM

## 2019-06-27 RX ORDER — OXYTOCIN/RINGER'S LACTATE 20/1000 ML
333 PLASTIC BAG, INJECTION (ML) INTRAVENOUS CONTINUOUS
Status: ACTIVE | OUTPATIENT
Start: 2019-06-27 | End: 2019-06-27

## 2019-06-27 RX ORDER — SODIUM CHLORIDE 9 MG/ML
INJECTION, SOLUTION INTRAVENOUS
Status: DISCONTINUED | OUTPATIENT
Start: 2019-06-27 | End: 2019-06-29 | Stop reason: HOSPADM

## 2019-06-27 RX ORDER — DIPHENOXYLATE HYDROCHLORIDE AND ATROPINE SULFATE 2.5; .025 MG/1; MG/1
1 TABLET ORAL 4 TIMES DAILY PRN
Status: DISCONTINUED | OUTPATIENT
Start: 2019-06-27 | End: 2019-06-29 | Stop reason: HOSPADM

## 2019-06-27 RX ORDER — METHYLERGONOVINE MALEATE 0.2 MG/ML
200 INJECTION INTRAVENOUS
Status: DISCONTINUED | OUTPATIENT
Start: 2019-06-27 | End: 2019-06-29 | Stop reason: HOSPADM

## 2019-06-27 RX ORDER — OXYTOCIN/RINGER'S LACTATE 20/1000 ML
10 PLASTIC BAG, INJECTION (ML) INTRAVENOUS
Status: DISCONTINUED | OUTPATIENT
Start: 2019-06-27 | End: 2019-06-29 | Stop reason: HOSPADM

## 2019-06-27 RX ORDER — SODIUM CITRATE AND CITRIC ACID MONOHYDRATE 334; 500 MG/5ML; MG/5ML
30 SOLUTION ORAL ONCE
Status: DISCONTINUED | OUTPATIENT
Start: 2019-06-27 | End: 2019-06-29 | Stop reason: HOSPADM

## 2019-06-27 RX ORDER — OXYTOCIN/RINGER'S LACTATE 20/1000 ML
2 PLASTIC BAG, INJECTION (ML) INTRAVENOUS CONTINUOUS
Status: DISCONTINUED | OUTPATIENT
Start: 2019-06-27 | End: 2019-06-29 | Stop reason: HOSPADM

## 2019-06-27 RX ORDER — IBUPROFEN 600 MG/1
600 TABLET ORAL EVERY 6 HOURS
Status: DISCONTINUED | OUTPATIENT
Start: 2019-06-27 | End: 2019-06-29 | Stop reason: HOSPADM

## 2019-06-27 RX ORDER — ONDANSETRON 8 MG/1
8 TABLET, ORALLY DISINTEGRATING ORAL EVERY 8 HOURS PRN
Status: DISCONTINUED | OUTPATIENT
Start: 2019-06-27 | End: 2019-06-27

## 2019-06-27 RX ORDER — ONDANSETRON 8 MG/1
8 TABLET, ORALLY DISINTEGRATING ORAL EVERY 8 HOURS PRN
Status: DISCONTINUED | OUTPATIENT
Start: 2019-06-27 | End: 2019-06-29 | Stop reason: HOSPADM

## 2019-06-27 RX ORDER — CEFAZOLIN SODIUM 2 G/50ML
2 SOLUTION INTRAVENOUS
Status: DISCONTINUED | OUTPATIENT
Start: 2019-06-27 | End: 2019-06-29 | Stop reason: HOSPADM

## 2019-06-27 RX ORDER — HYDROCODONE BITARTRATE AND ACETAMINOPHEN 5; 325 MG/1; MG/1
1 TABLET ORAL EVERY 4 HOURS PRN
Status: DISCONTINUED | OUTPATIENT
Start: 2019-06-27 | End: 2019-06-29 | Stop reason: HOSPADM

## 2019-06-27 RX ORDER — ACETAMINOPHEN 325 MG/1
650 TABLET ORAL EVERY 6 HOURS PRN
Status: DISCONTINUED | OUTPATIENT
Start: 2019-06-27 | End: 2019-06-29 | Stop reason: HOSPADM

## 2019-06-27 RX ORDER — FAMOTIDINE 10 MG/ML
20 INJECTION INTRAVENOUS ONCE
Status: DISCONTINUED | OUTPATIENT
Start: 2019-06-27 | End: 2019-06-29 | Stop reason: HOSPADM

## 2019-06-27 RX ORDER — OXYTOCIN/RINGER'S LACTATE 20/1000 ML
41.7 PLASTIC BAG, INJECTION (ML) INTRAVENOUS CONTINUOUS
Status: ACTIVE | OUTPATIENT
Start: 2019-06-27 | End: 2019-06-27

## 2019-06-27 RX ORDER — HYDROCODONE BITARTRATE AND ACETAMINOPHEN 10; 325 MG/1; MG/1
1 TABLET ORAL EVERY 4 HOURS PRN
Status: DISCONTINUED | OUTPATIENT
Start: 2019-06-27 | End: 2019-06-29 | Stop reason: HOSPADM

## 2019-06-27 RX ORDER — DIPHENHYDRAMINE HCL 25 MG
25 CAPSULE ORAL EVERY 4 HOURS PRN
Status: DISCONTINUED | OUTPATIENT
Start: 2019-06-27 | End: 2019-06-29 | Stop reason: HOSPADM

## 2019-06-27 RX ORDER — LIDOCAINE HYDROCHLORIDE AND EPINEPHRINE 15; 5 MG/ML; UG/ML
INJECTION, SOLUTION EPIDURAL
Status: DISCONTINUED | OUTPATIENT
Start: 2019-06-27 | End: 2019-06-27

## 2019-06-27 RX ORDER — DIPHENHYDRAMINE HYDROCHLORIDE 50 MG/ML
25 INJECTION INTRAMUSCULAR; INTRAVENOUS EVERY 4 HOURS PRN
Status: DISCONTINUED | OUTPATIENT
Start: 2019-06-27 | End: 2019-06-29 | Stop reason: HOSPADM

## 2019-06-27 RX ADMIN — LIDOCAINE HYDROCHLORIDE,EPINEPHRINE BITARTRATE 3 ML: 15; .005 INJECTION, SOLUTION EPIDURAL; INFILTRATION; INTRACAUDAL; PERINEURAL at 08:06

## 2019-06-27 RX ADMIN — SODIUM CHLORIDE, SODIUM LACTATE, POTASSIUM CHLORIDE, AND CALCIUM CHLORIDE: .6; .31; .03; .02 INJECTION, SOLUTION INTRAVENOUS at 07:06

## 2019-06-27 RX ADMIN — Medication 10 ML/HR: at 08:06

## 2019-06-27 RX ADMIN — Medication 2 MILLI-UNITS/MIN: at 05:06

## 2019-06-27 RX ADMIN — ONDANSETRON 8 MG: 8 TABLET, ORALLY DISINTEGRATING ORAL at 03:06

## 2019-06-27 RX ADMIN — ONDANSETRON 8 MG: 8 TABLET, ORALLY DISINTEGRATING ORAL at 08:06

## 2019-06-27 NOTE — ANESTHESIA PROCEDURE NOTES
Epidural    Patient location during procedure: OB   Reason for block: primary anesthetic   Start time: 6/27/2019 7:20 AM  Timeout: 6/27/2019 7:20 AM  End time: 6/27/2019 7:35 AM      Performing Provider: Kvng Horn MD  Authorizing Provider: Nanette Otoole MD      Preanesthetic Checklist  Completed: patient identified, site marked, surgical consent, pre-op evaluation, timeout performed, IV checked, risks and benefits discussed, monitors and equipment checked, anesthesia consent given, hand hygiene performed and patient being monitored  Preparation  Patient position: sitting  Prep: ChloraPrep  Patient monitoring: Pulse Ox and Blood Pressure  Epidural  Skin Anesthetic: lidocaine 1%  Skin Wheal: 3 mL  Administration type: continuous  Approach: midline  Interspace: L2-3    Injection technique: KATIE saline  Needle and Epidural Catheter  Needle type: Tuohy   Needle gauge: 17  Needle length: 3.5 inches  Needle insertion depth: 8 cm  Catheter type: springwound and multi-orifice  Catheter size: 19 G  Catheter at skin depth: 12 cm  Test dose: 3 mL of lidocaine 1.5% with Epi 1-to-200,000  Additional Documentation: incremental injection, negative aspiration for heme and CSF, no paresthesia on injection, no signs/symptoms of IV or SA injection, no significant pain on injection and no significant complaints from patient  Needle localization: anatomical landmarks  Assessment  Ease of block: easy  Patient's tolerance of the procedure: comfortable throughout block and no complaintsNo inadvertent dural puncture with Tuohy.  Dural puncture not performed with spinal needle

## 2019-06-27 NOTE — L&D DELIVERY NOTE
[facility]  Vaginal Delivery   Operative Note    SUMMARY   MD to bedside for delivery. Patient complete and +2 station. Staff and nursing called to room to begin pushing efforts. Approximately 3 adequate maternal pushes resulted in   Normal spontaneous vaginal delivery of live female infant. Infant was placed on mothers abdomen for skin to skin and bulb suctioning performed.  Infant delivered position LEANDRA over intact perineum.  Nuchal cord: No.    Spontaneous delivery of placenta and IV pitocin given noting good uterine tone.  No lacerations noted.  Patient tolerated delivery well. Sponge needle and lap counted correctly x2.    Indications: Fetal heart rate decelerations affecting management of mother  Pregnancy complicated by:   Patient Active Problem List   Diagnosis    Ureteral calculus    Supervision of other normal pregnancy, antepartum    Obesity (BMI 30-39.9)     (spontaneous vaginal delivery)    39 weeks gestation of pregnancy     Admitting GA: 39w0d    Delivery Information for  Mikal Agustin    Birth information:  YOB: 2021   Time of birth: 11:56 AM   Sex: female   Head Delivery Date/Time: 2019 11:56 AM   Delivery type: Vaginal, Spontaneous   Gestational Age: 39w0d    Delivery Providers    Delivering clinician:  Ely David MD   Provider Role    HERB Mckinney RN Macy L. Manzella Katharine Terese Veale, MD             Measurements    Weight:  3510 g  Length:  50.8 cm  Head circumference:  34.3 cm  Chest circumference:  35.6 cm         Apgars    Living status:  Living  Apgars:   1 min.:   5 min.:   10 min.:   15 min.:   20 min.:     Skin color:   1  1       Heart rate:   2  2       Reflex irritability:   2  2       Muscle tone:   2  2       Respiratory effort:   2  2       Total:   9  9       Apgars assigned by:  MARCO CARROLL RN         Operative Delivery    Forceps attempted?:  No  Vacuum extractor attempted?:  No          Shoulder Dystocia    Shoulder dystocia present?:  No           Presentation    Presentation:  Vertex  Position:  Right Occiput Anterior           Interventions/Resuscitation    Method:  Bulb Suctioning, Tactile Stimulation       Cord    Vessels:  3 vessels  Complications:  None  Delayed Cord Clamping?:  Yes  Cord Clamped Date/Time:  2019 11:57 AM  Cord Blood Disposition:  Sent with Baby  Gases Sent?:  No  Stem Cell Collection (by MD):  No       Placenta    Placenta delivery date/time:  2019 1159  Placenta removal:  Spontaneous  Placenta appearance:  Intact  Placenta disposition:  discarded           Labor Events:       labor: No     Labor Onset Date/Time:         Dilation Complete Date/Time:         Start Pushing Date/Time:       Rupture Date/Time:              Rupture type:           Fluid Amount:        Fluid Color:        Fluid Odor:        Membrane Status (PeriCalm): ARM (Artificial Rupture)      Rupture Date/Time (PeriCalm):        Fluid Amount (PeriCalm): Moderate      Fluid Color (PeriCalm): Clear       steroids: None     Antibiotics given for GBS: No     Induction:       Indications for induction:        Augmentation:       Indications for augmentation:       Labor complications: None     Additional complications:          Cervical ripening:                     Delivery:      Episiotomy: None     Indication for Episiotomy:       Perineal Lacerations: None Repaired:      Periurethral Laceration:   Repaired:     Labial Laceration:   Repaired:     Sulcus Laceration:   Repaired:     Vaginal Laceration:   Repaired:     Cervical Laceration:   Repaired:     Repair suture:       Repair # of packets: 0     Last Value - EBL - Nursing (mL): 200     Sum - EBL - Nursing (mL): 200     Last Value - EBL - Anesthesia (mL):      Calculated QBL (mL):        Vaginal Sweep Performed: Yes     Surgicount Correct: Yes       Other providers:       Anesthesia    Method:  Epidural           Details (if applicable):  Trial of Labor      Categorization:      Priority:     Indications for :     Incision Type:       Additional  information:  Forceps:    Vacuum:    Breech:    Observed anomalies    Other (Comments):

## 2019-06-27 NOTE — PROGRESS NOTES
"LABOR NOTE    Melissa Agustin is a 30 y.o. 30 y.o.  at 39w0d GA.    S: Complaints: pelvic pressure/pain. Epidural was working well, now having pain. .     O: /66   Pulse 103   Temp 98.7 °F (37.1 °C) (Oral)   Resp 16   Ht 5' 4" (1.626 m)   Wt 81 kg (178 lb 9.2 oz)   LMP 2018   SpO2 95%   Breastfeeding? No   BMI 30.65 kg/m²     FHT: Baseline rate - 130 BPM. Moderate BTBV. Occasional late and early decelerations.   Category 2 tracing.  CTX: Every 2 minutes  SVE: /+1, membranes ruptured.    A:   28 y.o.  at 39w0d, in active labor with reassuring fetal heart tracing.     Active Hospital Problems    Diagnosis  POA    *Category 2 Fetal Heart Tracing [O36.8390]  Unknown    39 weeks gestation of pregnancy [Z3A.39]  Not Applicable    Obesity (BMI 30-39.9) [E66.9]  Unknown    Asthma in remission [J45.998]  Yes      Resolved Hospital Problems   No resolved problems to display.       P:    Continue Active labor management:  Pitocin augmentation per protocol  Continue close maternal and fetal monitoring  Recheck 1 hour or sooner if needed.    Flo Ramirez M.D.  Obstetrics & Gynecology  PGY-2      "

## 2019-06-27 NOTE — H&P
HISTORY AND PHYSICAL                                                OBSTETRICS          Subjective:       Melissa Agustin is a 27 yo  at 38w6d presents for contractions. She states that she has been jonelle since around 7 pm and they are now every 5 minutes. No vaginal bleeding, leakage of fluid. Reports good fetal movement. This IUP is complicated by obesity and history of asthma.    PMHx:   Past Medical History:   Diagnosis Date    Abnormal Pap smear     since , Colpo + HPV    Anemia     Asthma     mild       PSHx:   Past Surgical History:   Procedure Laterality Date    COLPOSCOPY      CYSTOSCOPY  2018    Performed by Mau [facility]ma Jr., [facility] at [facility] OR [address] [place] [address]    CYSTOSCOPY N/A 3/17/2016    Performed by [facility]ma Jr[facility] at [facility] OR [address] [place] [address]    KIDNEY STONE SURGERY  2015    LITHOTRIPSY-EXTRACORPOREAL SHOCK WAVE/in cysto room 2 Right 3/17/2016    Performed by Mau [facility]ma Jr., MD at [facility] OR [address] [place] [address]    PLACEMENT-STENTJJ stent with string Right 3/17/2016    Performed by M[facility]ma Jr[facility] at [facility] OR [address] [place] [address]    right shoulder      SHOULDER SURGERY      right shoulder    THUMB ARTHROSCOPY      thumb surgery    thumb surgery      URETEROSCOPY Left 2018    Performed by Mau Led[facility]ma Jr., MD at [facility] OR [address] [place] [address]       All: Review of patient's allergies indicates:  No Known Allergies    Meds:   Medications Prior to Admission   Medication Sig Dispense Refill Last Dose    doxylamine-pyridoxine, vit B6, (DICLEGIS) 10-10 mg TbEC Take 2 tablets by mouth every evening. 30 tablet 0 Past Week at Unknown time       SH:   Social History     Socioeconomic History    Marital status:      Spouse name: Not on file    Number of children: Not on file    Years of education: Not on file    Highest education level: Not on file   Occupational History    Occupation: [employer]     Social Needs    Financial resource strain: Not on file    Food insecurity:      Worry: Not on file     Inability: Not on file    Transportation needs:     Medical: Not on file     Non-medical: Not on file   Tobacco Use    Smoking status: Never Smoker    Smokeless tobacco: Never Used   Substance and Sexual Activity    Alcohol use: Yes     Comment: socially    Drug use: No    Sexual activity: Yes     Partners: Male     Birth control/protection: None   Lifestyle    Physical activity:     Days per week: Not on file     Minutes per session: Not on file    Stress: Not on file   Relationships    Social connections:     Talks on phone: Not on file     Gets together: Not on file     Attends Adventism service: Not on file     Active member of club or organization: Not on file     Attends meetings of clubs or organizations: Not on file     Relationship status: Not on file   Other Topics Concern    Not on file   Social History Narrative    Not on file       FH:   Family History   Problem Relation Age of Onset    Ovarian cancer Paternal Grandmother     Colon cancer Neg Hx     Breast cancer Neg Hx        OBHx:   OB History    Para Term  AB Living   2 1 1 0 0 1   SAB TAB Ectopic Multiple Live Births   0 0 0 0 1      # Outcome Date GA Lbr Oneil/2nd Weight Sex Delivery Anes PTL Lv   2 Current            1 Term 12 37w0d  2.863 kg (6 lb 5 oz) F Vag-Spont EPI  JEVON      Birth Comments: System Generated. Please review and update pregnancy details.       Review of Systems   Constitutional: Negative for activity change and appetite change.   Eyes: Negative for photophobia and visual disturbance.   Respiratory: Negative for chest tightness and shortness of breath.    Cardiovascular: Negative for chest pain.   Gastrointestinal: Positive for abdominal pain. Negative for constipation, diarrhea, nausea and vomiting.   Genitourinary: Positive for pelvic pain. Negative for dysuria, vaginal bleeding and vaginal discharge.   Neurological: Negative for dizziness, light-headedness and headaches.  "    Objective:       /65   Pulse 101   Temp 98.9 °F (37.2 °C) (Oral)   Resp 16   Ht 5' 4" (1.626 m)   Wt 81 kg (178 lb 9.2 oz)   LMP 09/27/2018   SpO2 98%   Breastfeeding? No   BMI 30.65 kg/m²     Vitals:    06/27/19 0257 06/27/19 0300 06/27/19 0302 06/27/19 0313   BP:  116/65     Pulse: 98 98 101    Resp:       Temp:       TempSrc:       SpO2: 97%  98%    Weight:    81 kg (178 lb 9.2 oz)   Height:    5' 4" (1.626 m)       General:   alert, appears stated age and cooperative   Lungs:   normal respiratory effort   Heart:   regular rate and rhythm   Abdomen:  soft, non-tender; bowel sounds normal; no masses,  no organomegaly   Extremities negative edema, negative erythema   FHT: 150 bpm, moderate variability, +accels, occasional decels Cat 2 (reassuring)                 TOCO: Q 3-5 minutes   Presentations: cephalic by ultrasound   Cervix:     Dilation: 3cm    Effacement: 75%    Station:  -2     Lab Review  Blood Type A POS  GBBS: negative  Rubella: Immune  RPR: Nonreactive  HIV: negative  HepB: negative       Assessment:       39w0d weeks gestation    Active Hospital Problems    Diagnosis  POA    *Category 2 Fetal Heart Tracing [O36.8390]  Unknown    39 weeks gestation of pregnancy [Z3A.39]  Not Applicable    Obesity (BMI 30-39.9) [E66.9]  Unknown    Asthma in remission [J45.998]  Yes      Resolved Hospital Problems   No resolved problems to display.          Plan:      Risks, benefits, alternatives and possible complications have been discussed in detail with the patient.   - Consents signed and to chart  - Admit to Labor and Delivery unit  - Pitocin for labor augmentation   - Epidural per Anesthesia  - Draw CBC, T&S  - Notify Staff  - Recheck in 2 hrs or PRN    Post-Partum Hemorrhage risk - low          Fransisca Leavitt MD  OBGYN PGY-1        "

## 2019-06-27 NOTE — LACTATION NOTE
LC visit to the room. Mother said baby has been asleep. Baby being held baby family members. LC suggested unwrapping baby and placing skin to skin to skin to get baby a bit more interested. LC explained that if skin to skin does not work then try to hand express and spoon feed. LC gave mother spoons. Mother is not ready to try to nurse. She is visiting with family. LC explained that she would only be here about 20 more minutes.LC left phone number on mother's white board for mother to call for asst as needed.Told mother what time LC leaves the floor. Mother also told that LC can see when she calls [facility] phone and if LC does not answer.

## 2019-06-27 NOTE — ED NOTES
Pt presents to OB ED c/o ctx q5mins x 5 hours, 7/10 on pain scale. Pt reports + fetal movement, denies vaginal bleeding or leaking of fluids.

## 2019-06-27 NOTE — ANESTHESIA PREPROCEDURE EVALUATION
2019  Melissa Agustin is a 28 y.o., female.  Melissa Agustin is a 28 y.o. female  at 38w6d presents for contractions. This IUP is complicated by obesity and history of asthma (last used inhaler over 20y ago). Denies other cardiopulm dz, bleeding d/o, neck/back dz.    OB History    Para Term  AB Living   2 1 1     1   SAB TAB Ectopic Multiple Live Births           1      # Outcome Date GA Lbr Oneil/2nd Weight Sex Delivery Anes PTL Lv   2 Current            1 Term 12 37w0d  2.863 kg (6 lb 5 oz) F Vag-Spont EPI  JEVON      Birth Comments: System Generated. Please review and update pregnancy details.       Wt Readings from Last 1 Encounters:   19 0313 81 kg (178 lb 9.2 oz)       BP Readings from Last 3 Encounters:   19 116/65   19 126/80   19 122/72       Patient Active Problem List   Diagnosis    Asthma in remission    Ureteral calculus    Supervision of other normal pregnancy, antepartum    39 weeks gestation of pregnancy    Obesity (BMI 30-39.9)    Category 2 Fetal Heart Tracing       Past Surgical History:   Procedure Laterality Date    COLPOSCOPY      CYSTOSCOPY  2018    Performed by [facility] Jr., [facility] at [facility]    CYSTOSCOPY N/A 3/17/2016    Performed by [facility] Jr[facility] at [facility]    KIDNEY STONE SURGERY  2015    LITHOTRIPSY-EXTRACORPOREAL SHOCK WAVE/in cysto room 2 Right 3/17/2016    Performed [facility] at [facility]    PLACEMENT-STENTJJ stent with string Right 3/17/2016    Performed by [facility] at [facility]    right shoulder      SHOULDER SURGERY      right shoulder    THUMB ARTHROSCOPY      thumb surgery    thumb surgery      URETEROSCOPY Left 2018    Performed by [facility] at [facility]       Social History     Socioeconomic History     Marital status:      Spouse name: Not on file    Number of children: Not on file    Years of education: Not on file    Highest education level: Not on file   Occupational History    Occupation: [employer]     Social Needs    Financial resource strain: Not on file    Food insecurity:     Worry: Not on file     Inability: Not on file    Transportation needs:     Medical: Not on file     Non-medical: Not on file   Tobacco Use    Smoking status: Never Smoker    Smokeless tobacco: Never Used   Substance and Sexual Activity    Alcohol use: Yes     Comment: socially    Drug use: No    Sexual activity: Yes     Partners: Male     Birth control/protection: None   Lifestyle    Physical activity:     Days per week: Not on file     Minutes per session: Not on file    Stress: Not on file   Relationships    Social connections:     Talks on phone: Not on file     Gets together: Not on file     Attends Yazidism service: Not on file     Active member of club or organization: Not on file     Attends meetings of clubs or organizations: Not on file     Relationship status: Not on file   Other Topics Concern    Not on file   Social History Narrative    Not on file         Chemistry        Component Value Date/Time     06/20/2018 0723    K 3.7 06/20/2018 0723     06/20/2018 0723    CO2 27 06/20/2018 0723    BUN 18 06/20/2018 0723    CREATININE 0.7 06/20/2018 0723     06/20/2018 0723        Component Value Date/Time    CALCIUM 9.1 06/20/2018 0723    ESTGFRAFRICA >60.0 06/20/2018 0723    EGFRNONAA >60.0 06/20/2018 0723            Lab Results   Component Value Date    WBC 12.54 06/27/2019    HGB 10.3 (L) 06/27/2019    HCT 29.5 (L) 06/27/2019    MCV 86 06/27/2019     (H) 06/27/2019       No results for input(s): PT, INR, PROTIME, APTT in the last 72 hours.                Anesthesia Evaluation    I have reviewed the Patient Summary Reports.     I have reviewed the Medications.      Review of Systems  Anesthesia Hx:  No problems with previous Anesthesia  History of prior surgery of interest to airway management or planning:  Denies Personal Hx of Anesthesia complications.   Hematology/Oncology:     Oncology Normal    -- Anemia:   EENT/Dental:EENT/Dental Normal   Cardiovascular:  Cardiovascular Normal     Pulmonary:   Asthma (inhaler >20y ago)    Renal/:  Renal/ Normal     Hepatic/GI:  Hepatic/GI Normal    Musculoskeletal:  Musculoskeletal Normal    Neurological:  Neurology Normal    Endocrine:  Endocrine Normal    Dermatological:  Skin Normal    Psych:  Psychiatric Normal           Physical Exam  General:  Well nourished    Airway/Jaw/Neck:  Airway Findings: Mouth Opening: Normal Tongue: Normal  General Airway Assessment: Adult  Mallampati: III  TM Distance: 4 - 6 cm  Jaw/Neck Findings:  Neck ROM: Normal ROM     Eyes/Ears/Nose:  EYES/EARS/NOSE FINDINGS: Normal   Dental:  Dental Findings: In tact   Chest/Lungs:  Chest/Lungs Findings: Clear to auscultation     Heart/Vascular:  Heart Findings: Rate: Normal  Rhythm: Regular Rhythm  Heart murmur: negative       Mental Status:  Mental Status Findings:  Cooperative, Alert and Oriented         Anesthesia Plan  Type of Anesthesia, risks & benefits discussed:  Anesthesia Type:  epidural, CSE, general, spinal  Patient's Preference:   Intra-op Monitoring Plan: standard ASA monitors  Intra-op Monitoring Plan Comments:   Post Op Pain Control Plan: multimodal analgesia, IV/PO Opioids PRN and per primary service following discharge from PACU  Post Op Pain Control Plan Comments:   Induction:   IV  Beta Blocker:         Informed Consent: Patient understands risks and agrees with Anesthesia plan.  Questions answered. Anesthesia consent signed with patient.  ASA Score: 2     Day of Surgery Review of History & Physical:            Ready For Surgery From Anesthesia Perspective.

## 2019-06-27 NOTE — ED PROVIDER NOTES
Encounter Date: 2019       History     Chief Complaint   Patient presents with    Contractions     5 mins apart since 7pm     27 yo  at 38w6d presents for contractions. She states that she has been jonelle since around 7 pm and they are now every 5 minutes. No vaginal bleeding, leakage of fluid. Reports good fetal movement. This IUP is complicated by obesity and history of asthma.        Review of patient's allergies indicates:  No Known Allergies  Past Medical History:   Diagnosis Date    Abnormal Pap smear     since , Colpo + HPV    Anemia     Asthma     mild     Past Surgical History:   Procedure Laterality Date    COLPOSCOPY      CYSTOSCOPY  2018    Performed by [facility] Jr[facility] MD at [facility] OR [address] [place] [address]    CYSTOSCOPY N/A 3/17/2016    Performed by [facility] Jr[facility] MD at [facility] OR [address] [place] [address]    KIDNEY STONE SURGERY  2015    LITHOTRIPSY-EXTRACORPOREAL SHOCK WAVE/in cysto room 2 Right 3/17/2016    Performed by [facility] Jr[facility] at [facility] [address] [place] [address]    PLACEMENT-STENTJJ stent with string Right 3/17/2016    Performed by [facility] Jr[facility] MD at [facility] OR [address] [place] [address]    right shoulder      SHOULDER SURGERY      right shoulder    THUMB ARTHROSCOPY      thumb surgery    thumb surgery      URETEROSCOPY Left 2018    Performed by [facility]ma Jr., MD at [facility] OR [address] [place] [address]     Family History   Problem Relation Age of Onset    Ovarian cancer Paternal Grandmother     Colon cancer Neg Hx     Breast cancer Neg Hx      Social History     Tobacco Use    Smoking status: Never Smoker    Smokeless tobacco: Never Used   Substance Use Topics    Alcohol use: Yes     Comment: socially    Drug use: No     Review of Systems   Constitutional: Negative for activity change and appetite change.   Eyes: Negative for photophobia and visual disturbance.   Respiratory: Negative for chest tightness and shortness of breath.    Cardiovascular: Negative for chest pain.   Gastrointestinal: Positive  for abdominal pain. Negative for constipation, diarrhea, nausea and vomiting.   Genitourinary: Positive for pelvic pain. Negative for dysuria, vaginal bleeding and vaginal discharge.   Neurological: Negative for dizziness, light-headedness and headaches.       Physical Exam     Initial Vitals [06/26/19 2357]   BP Pulse Resp Temp SpO2   134/86 97 16 98.9 °F (37.2 °C) 99 %      MAP       --         Physical Exam    Vitals reviewed.  Constitutional: She appears well-developed and well-nourished. She is not diaphoretic. No distress.   HENT:   Head: Normocephalic and atraumatic.   Nose: Nose normal.   Eyes: Conjunctivae are normal.   Neck: Normal range of motion.   Cardiovascular: Normal rate.   Pulmonary/Chest: No respiratory distress.   Abdominal: Soft. She exhibits no distension. There is no tenderness.   Gravid   Musculoskeletal: She exhibits no edema or tenderness.   Neurological: She is alert and oriented to person, place, and time.   Skin: Skin is warm and dry.   Psychiatric: She has a normal mood and affect. Her behavior is normal. Judgment and thought content normal.     OB LABOR EXAM:   Pre-Term Labor: No.   Membranes ruptured: No.   Method: Sterile vaginal exam per MD.   Vaginal Bleeding: none present.     Dilatation: 2.   Station: -3.   Effacement: 60%.   Amniotic Fluid Color: no fluid.           ED Course   Obtain Fetal nonstress test (NST)  Date/Time: 6/27/2019 12:05 AM  Performed by: Fransisca Leavitt MD  Authorized by: Fransisca Leavitt MD     Nonstress Test:     Variability:  6-25 BPM    Decelerations:  None    Accelerations:  15 bpm    Baseline:  135    Contractions:  Regular    Contraction Frequency:  Q 5 minutes  Biophysical Profile:     Nonstress Test Interpretation: reactive      Overall Impression:  Reassuring      Labs Reviewed - No data to display       Imaging Results    None          Medical Decision Making:   ED Management:  VSS. NST reactive and reassuring. Cervix 2.5 cm dilated,  unchanged from last clinic check. Offered patient 2 hour rule out vs. D/C home. Patient opted for 2 hour rule out.    After 2 hours, cervix unchanged. However, FHT with intermittent late decels. After discussion with Dr. David, will admit for augmentation due to Category 2 tracing.               Attending Attestation:   Physician Attestation Statement for Resident:  As the supervising MD   Physician Attestation Statement: I have personally seen and examined this patient.   I agree with the above history. -:   As the supervising MD I agree with the above PE.    As the supervising MD I agree with the above treatment, course, plan, and disposition.  I was personally present during the critical portions of the procedure(s) performed by the resident and was immediately available in the ED to provide services and assistance as needed during the entire procedure.  I have reviewed and agree with the residents interpretation of the following: rhythm strips.  I have reviewed the following: old records at this facility.                    ED Course as of 1127   Th 2019   0012 I evaluated Ms. Agustin and discussed plan with Dr. Leavitt.  Pt presents with contractions since 7pm which are becoming more painful and frequent.  Cervix unchanged from last office visit at 2.5cm.  Fetal status reactive and reassuring.  Contractions 5 minutes.  Now starting to see some lates,  will continue to monitor    [HU]   0024 Lates appear to be cleaned up by pt off her side.  Will watch over the next hour    [HU]      ED Course User Index  [HU] Alie Dobbs DO     Clinical Impression:       ICD-10-CM ICD-9-CM   1. Fetal heart rate decelerations affecting management of mother O36.8390 659.70   2. 39 weeks gestation of pregnancy Z3A.39 V22.2   3. Uterine contractions during pregnancy O62.2 661.20   4.  (spontaneous vaginal delivery) O80 650         Disposition:   Disposition: Admitted  Condition:  Stable                        Fransisca Leavitt MD  Resident  06/27/19 0322       Alie Dobbs,   07/13/19 1128

## 2019-06-28 LAB
BASOPHILS # BLD AUTO: 0.03 K/UL (ref 0–0.2)
BASOPHILS NFR BLD: 0.2 % (ref 0–1.9)
DIFFERENTIAL METHOD: ABNORMAL
EOSINOPHIL # BLD AUTO: 0.3 K/UL (ref 0–0.5)
EOSINOPHIL NFR BLD: 2 % (ref 0–8)
ERYTHROCYTE [DISTWIDTH] IN BLOOD BY AUTOMATED COUNT: 14.6 % (ref 11.5–14.5)
HCT VFR BLD AUTO: 27.1 % (ref 37–48.5)
HGB BLD-MCNC: 9 G/DL (ref 12–16)
LYMPHOCYTES # BLD AUTO: 2.4 K/UL (ref 1–4.8)
LYMPHOCYTES NFR BLD: 15.6 % (ref 18–48)
MCH RBC QN AUTO: 28.5 PG (ref 27–31)
MCHC RBC AUTO-ENTMCNC: 33.2 G/DL (ref 32–36)
MCV RBC AUTO: 86 FL (ref 82–98)
MONOCYTES # BLD AUTO: 1.2 K/UL (ref 0.3–1)
MONOCYTES NFR BLD: 8 % (ref 4–15)
NEUTROPHILS # BLD AUTO: 11.2 K/UL (ref 1.8–7.7)
NEUTROPHILS NFR BLD: 74.2 % (ref 38–73)
PLATELET # BLD AUTO: 377 K/UL (ref 150–350)
PMV BLD AUTO: 10 FL (ref 9.2–12.9)
RBC # BLD AUTO: 3.16 M/UL (ref 4–5.4)
WBC # BLD AUTO: 15.15 K/UL (ref 3.9–12.7)

## 2019-06-28 PROCEDURE — 36415 COLL VENOUS BLD VENIPUNCTURE: CPT

## 2019-06-28 PROCEDURE — 11000001 HC ACUTE MED/SURG PRIVATE ROOM

## 2019-06-28 PROCEDURE — 25000003 PHARM REV CODE 250: Performed by: STUDENT IN AN ORGANIZED HEALTH CARE EDUCATION/TRAINING PROGRAM

## 2019-06-28 PROCEDURE — 85025 COMPLETE CBC W/AUTO DIFF WBC: CPT

## 2019-06-28 RX ADMIN — IBUPROFEN 600 MG: 600 TABLET ORAL at 12:06

## 2019-06-28 RX ADMIN — IBUPROFEN 600 MG: 600 TABLET ORAL at 06:06

## 2019-06-28 NOTE — ANESTHESIA POSTPROCEDURE EVALUATION
Anesthesia Post Evaluation    Patient: Melissa Agustin    Procedure(s) Performed: * No procedures listed *    Final Anesthesia Type: epidural  Patient location during evaluation: labor & delivery  Patient participation: Yes- Able to Participate  Level of consciousness: awake and alert  Post-procedure vital signs: reviewed and stable  Pain management: adequate  Airway patency: patent  PONV status at discharge: No PONV  Anesthetic complications: no      Cardiovascular status: blood pressure returned to baseline and stable  Respiratory status: room air, unassisted and spontaneous ventilation  Hydration status: euvolemic  Follow-up not needed.          Vitals Value Taken Time   /74 6/28/2019  8:00 AM   Temp 36.7 °C (98.1 °F) 6/28/2019  8:00 AM   Pulse 75 6/28/2019  8:00 AM   Resp 18 6/28/2019  8:00 AM   SpO2 97 % 6/28/2019  8:00 AM         No case tracking events are documented in the log.      Pain/Kunal Score: Pain Rating Prior to Med Admin: 2 (6/28/2019 12:02 PM)  Pain Rating Post Med Admin: 0 (6/28/2019  1:00 PM)

## 2019-06-28 NOTE — PROGRESS NOTES
POSTPARTUM PROGRESS NOTE     Melissa Agustin is a 28 y.o. female PPD #1 status post Spontaneous vaginal delivery at 39w0d in a pregnancy complicated by obesity and history of asthma.   Patient is doing well this morning. She denies nausea, vomiting, fever or chills.  Patient reports mild abdominal pain that is well relieved by oral pain medications. Lochia is mild. Patient is voiding without difficulty and ambulating with no difficulty. She has passed flatus, and has not had BM.  Patient does plan to breast feed. Patient will defer to primary ob for contraception.     Objective:       Temp:  [97.8 °F (36.6 °C)-99 °F (37.2 °C)] 98.6 °F (37 °C)  Pulse:  [] 73  Resp:  [16-18] 18  SpO2:  [94 %-100 %] 98 %  BP: (108-138)/(62-89) 122/73    General:   alert, appears stated age and cooperative   Lungs:   clear to auscultation bilaterally   Heart:   regular rate and rhythm, S1, S2 normal, no murmur, click, rub or gallop   Abdomen:  soft, non-tender; bowel sounds normal; no masses,  no organomegaly   Uterus:  firm located at the umblicus.        Extremities: peripheral pulses normal, no pedal edema, no clubbing or cyanosis     Lab Review  Recent Results (from the past 4 hour(s))   CBC auto differential    Collection Time: 06/28/19  4:07 AM   Result Value Ref Range    WBC 15.15 (H) 3.90 - 12.70 K/uL    RBC 3.16 (L) 4.00 - 5.40 M/uL    Hemoglobin 9.0 (L) 12.0 - 16.0 g/dL    Hematocrit 27.1 (L) 37.0 - 48.5 %    Mean Corpuscular Volume 86 82 - 98 fL    Mean Corpuscular Hemoglobin 28.5 27.0 - 31.0 pg    Mean Corpuscular Hemoglobin Conc 33.2 32.0 - 36.0 g/dL    RDW 14.6 (H) 11.5 - 14.5 %    Platelets 377 (H) 150 - 350 K/uL    MPV 10.0 9.2 - 12.9 fL    Gran # (ANC) 11.2 (H) 1.8 - 7.7 K/uL    Lymph # 2.4 1.0 - 4.8 K/uL    Mono # 1.2 (H) 0.3 - 1.0 K/uL    Eos # 0.3 0.0 - 0.5 K/uL    Baso # 0.03 0.00 - 0.20 K/uL    Gran% 74.2 (H) 38.0 - 73.0 %    Lymph% 15.6 (L) 18.0 - 48.0 %    Mono% 8.0 4.0 - 15.0 %    Eosinophil% 2.0 0.0 - 8.0  %    Basophil% 0.2 0.0 - 1.9 %    Differential Method Automated        I/O    Intake/Output Summary (Last 24 hours) at 2019 0709  Last data filed at 2019 1500  Gross per 24 hour   Intake 2558.95 ml   Output 750 ml   Net 1808.95 ml        Assessment:     Patient Active Problem List   Diagnosis    Ureteral calculus    Supervision of other normal pregnancy, antepartum    Obesity (BMI 30-39.9)     (spontaneous vaginal delivery)    39 weeks gestation of pregnancy        Plan:   1. Postpartum care:  - Patient doing well. Continue routine management and advances.  - Continue PO pain meds. Pain well controlled.  - Heme: H/h 10/30 >   - Encourage ambulation  - Contraception per primary ob   - Lactation consultation PRN   - Rh status A pos     2. History of asthma   - currently asymptomatic     Dispo: As patient meets milestones, will plan to discharge PPD#2.    Kristina Wiggins MD  OBGYN, PGY-1    I have reviewed and concur with the resident's history, physical assessment and plan.  I have personally interviewed and examined the patient at bedside.    Kathleen Ramos MD  Obstetrics and Gynecology  [facility]

## 2019-06-28 NOTE — PROGRESS NOTES
LightArrowhony pump, tubing, collections containers and labels brought to bedside.  Discussed proper pump setting of initiation phase.  Instructed on proper usage of pump and to adjust suction according to maximum comfort level.  Verified appropriate flange fit.  Educated on the frequency and duration of pumping in order to promote and maintain a full milk supply.  Hands on pumping technique reviewed.  Encouraged hand expression after pumping.  Instructed on cleaning of breast pump parts.  Written instructions also given.  Pt verbalized understanding and appropriate recall for proper milk handling, collection, labeling, storage and transportation.

## 2019-06-28 NOTE — PROGRESS NOTES
Instructed on the risks of early pacifier or artificial nipple use, including:   Decreased milk supply due to decreased breast stimulation from delayed or skipped feedings with pacifier use   Nipple confusion due to the promotion of non-nutritive sucking on the pacifier/nipple   Increased nipple soreness due to non-nutritive sucking   Skipped feedings the increases chance of engorgement, mastitis and plugged ducts   Decreases the duration of exclusive breastfeeding  States understanding and verbalized appropriate recall.

## 2019-06-29 VITALS
BODY MASS INDEX: 30.48 KG/M2 | RESPIRATION RATE: 18 BRPM | SYSTOLIC BLOOD PRESSURE: 105 MMHG | DIASTOLIC BLOOD PRESSURE: 66 MMHG | HEIGHT: 64 IN | HEART RATE: 61 BPM | OXYGEN SATURATION: 98 % | TEMPERATURE: 98 F | WEIGHT: 178.56 LBS

## 2019-06-29 PROCEDURE — 25000003 PHARM REV CODE 250: Performed by: STUDENT IN AN ORGANIZED HEALTH CARE EDUCATION/TRAINING PROGRAM

## 2019-06-29 RX ORDER — IBUPROFEN 600 MG/1
600 TABLET ORAL EVERY 6 HOURS
Qty: 30 TABLET | Refills: 2 | Status: SHIPPED | OUTPATIENT
Start: 2019-06-29 | End: 2019-08-19

## 2019-06-29 RX ADMIN — IBUPROFEN 600 MG: 600 TABLET ORAL at 12:06

## 2019-06-29 RX ADMIN — IBUPROFEN 600 MG: 600 TABLET ORAL at 11:06

## 2019-06-29 RX ADMIN — HYDROCODONE BITARTRATE AND ACETAMINOPHEN 1 TABLET: 10; 325 TABLET ORAL at 03:06

## 2019-06-29 NOTE — DISCHARGE SUMMARY
Delivery Discharge Summary  Obstetrics      Primary OB Clinician: Ely David MD      Admission date: 2019  Discharge date: 2019    Disposition: To home, self care    Discharge Diagnosis List:      Patient Active Problem List   Diagnosis    Ureteral calculus    Supervision of other normal pregnancy, antepartum    Obesity (BMI 30-39.9)     (spontaneous vaginal delivery)    39 weeks gestation of pregnancy       Procedure:     Hospital Course:  Melissa Agustin is a 28 y.o. now , PPD #2 who was admitted on 2019 at 39w0d for contractions. Patient was subsequently admitted to labor and delivery unit with signed consents.     Labor course was uncomplicated and resulted in  without complications.     Please see delivery note for further details. Her postpartum course was uncomplicated. On discharge day, patient's pain is controlled with oral pain medications. Pt is tolerating ambulation without SOB or CP, and regular diet without N/V. Reports lochia is mild. Denies any HA, vision changes, F/C, LE swelling. Denies any breast pain/soreness.    Pt in stable condition and ready for discharge. She has been instructed to start and/or continue medications and follow up with her obstetrics provider as listed below.    Pertinent studies:  CBC  Recent Labs   Lab 19  0320 19  0407   WBC 12.54 15.15*   HGB 10.3* 9.0*   HCT 29.5* 27.1*   MCV 86 86   * 377*          Immunization History   Administered Date(s) Administered    Tdap 05/15/2019        Delivery:    Episiotomy: None   Lacerations: None   Repair suture:     Repair # of packets: 0   Blood loss (ml): 200     Birth information:  YOB: 2021   Time of birth: 11:56 AM   Sex: female   Delivery type: Vaginal, Spontaneous   Gestational Age: 39w0d    Delivery Clinician:      Other providers:       Additional  information:  Forceps:    Vacuum:    Breech:    Observed anomalies      Living?:           APGARS   One minute Five minutes Ten minutes   Skin color:         Heart rate:         Grimace:         Muscle tone:         Breathing:         Totals: 9  9        Placenta: Delivered:       appearance      Patient Instructions:   Current Discharge Medication List      START taking these medications    Details   ibuprofen (ADVIL,MOTRIN) 600 MG tablet Take 1 tablet (600 mg total) by mouth every 6 (six) hours.  Qty: 30 tablet, Refills: 2         STOP taking these medications       doxylamine-pyridoxine, vit B6, (DICLEGIS) 10-10 mg TbEC Comments:   Reason for Stopping:               Discharge Procedure Orders   Other restrictions (specify):   Order Comments: Nothing in the vagina for six weeks     Notify your health care provider if you experience any of the following:  temperature >100.4     Notify your health care provider if you experience any of the following:  persistent nausea and vomiting or diarrhea     Notify your health care provider if you experience any of the following:  severe uncontrolled pain     Notify your health care provider if you experience any of the following:  difficulty breathing or increased cough     Notify your health care provider if you experience any of the following:  severe persistent headache     Notify your health care provider if you experience any of the following:  persistent dizziness, light-headedness, or visual disturbances     Notify your health care provider if you experience any of the following:  increased confusion or weakness     Notify your health care provider if you experience any of the following:   Order Comments: Heavy vaginal bleeding >1 pad/hour for greater than 2 hours     Activity as tolerated       Follow-up Information     Ely David MD In 6 weeks.    Specialties:  Obstetrics, Obstetrics and Gynecology  Why:  post partum visit  Contact information:  [address] [id number] [phone]                    Kristina Wiggins MD  OBGYN,  PGY-1

## 2019-06-29 NOTE — PROGRESS NOTES
POSTPARTUM PROGRESS NOTE     Melissa Agustin is a 28 y.o. female PPD #2 status post Spontaneous vaginal delivery at 39w0d in a pregnancy complicated by obesity and history of asthma.   Patient is doing well this morning. She denies nausea, vomiting, fever or chills.  Patient reports mild abdominal pain that is well relieved by oral pain medications. Lochia is mild. Patient is voiding without difficulty and ambulating with no difficulty. She has passed flatus, and has not had BM.  Patient does plan to breast feed. Patient will defer to primary ob for contraception.     Objective:       Temp:  [97.9 °F (36.6 °C)-98.5 °F (36.9 °C)] 97.9 °F (36.6 °C)  Pulse:  [64-75] 71  Resp:  [16-18] 16  SpO2:  [97 %-100 %] 100 %  BP: ()/(56-74) 98/56    General:   alert, appears stated age and cooperative   Lungs:   clear to auscultation bilaterally   Heart:   regular rate and rhythm, S1, S2 normal, no murmur, click, rub or gallop   Abdomen:  soft, non-tender; bowel sounds normal; no masses,  no organomegaly   Uterus:  firm located at the umblicus.        Extremities: peripheral pulses normal, no pedal edema, no clubbing or cyanosis     Lab Review  No results found for this or any previous visit (from the past 4 hour(s)).    I/O  No intake or output data in the 24 hours ending 19 0640     Assessment:     Patient Active Problem List   Diagnosis    Ureteral calculus    Supervision of other normal pregnancy, antepartum    Obesity (BMI 30-39.9)     (spontaneous vaginal delivery)    39 weeks gestation of pregnancy        Plan:   1. Postpartum care:  - Patient doing well. Continue routine management and advances.  - Continue PO pain meds. Pain well controlled.  - Heme: H/h 10/30 >   - Encourage ambulation  - Contraception per primary ob   - Lactation consultation PRN   - Rh status A pos     2. History of asthma   - currently asymptomatic     Dispo: As patient meets milestones, will plan to discharge  PPD#2.    Kristina Wiggins MD  OBN, PGY-1

## 2019-07-01 ENCOUNTER — PATIENT MESSAGE (OUTPATIENT)
Dept: OBSTETRICS AND GYNECOLOGY | Facility: CLINIC | Age: 29
End: 2019-07-01

## 2019-07-01 NOTE — TELEPHONE ENCOUNTER
Left message to patient to call the office at [phone] regarding lower back pain and cramping not resolved from medication.

## 2019-07-15 ENCOUNTER — TELEPHONE (OUTPATIENT)
Dept: OBSTETRICS AND GYNECOLOGY | Facility: OTHER | Age: 29
End: 2019-07-15

## 2019-07-15 NOTE — TELEPHONE ENCOUNTER
Pt doing well. Pt pain well controlled. Pt still needs to schedule 6 week post partum visit. Infant doing well and has been seen by the pediatrician. Pt is pumping.

## 2019-08-19 ENCOUNTER — TELEPHONE (OUTPATIENT)
Dept: OBSTETRICS AND GYNECOLOGY | Facility: CLINIC | Age: 29
End: 2019-08-19

## 2019-08-19 ENCOUNTER — POSTPARTUM VISIT (OUTPATIENT)
Dept: OBSTETRICS AND GYNECOLOGY | Facility: CLINIC | Age: 29
End: 2019-08-19
Attending: OBSTETRICS & GYNECOLOGY
Payer: COMMERCIAL

## 2019-08-19 VITALS
HEIGHT: 64 IN | DIASTOLIC BLOOD PRESSURE: 64 MMHG | WEIGHT: 161.38 LBS | SYSTOLIC BLOOD PRESSURE: 100 MMHG | BODY MASS INDEX: 27.55 KG/M2

## 2019-08-19 DIAGNOSIS — Z30.431 INTRAUTERINE DEVICE SURVEILLANCE: Primary | ICD-10-CM

## 2019-08-19 PROBLEM — Z34.80 SUPERVISION OF OTHER NORMAL PREGNANCY, ANTEPARTUM: Status: RESOLVED | Noted: 2018-11-27 | Resolved: 2019-08-19

## 2019-08-19 PROBLEM — Z3A.39 39 WEEKS GESTATION OF PREGNANCY: Status: RESOLVED | Noted: 2019-06-27 | Resolved: 2019-08-19

## 2019-08-19 PROCEDURE — 99999 PR PBB SHADOW E&M-EST. PATIENT-LVL III: CPT | Mod: PBBFAC,,, | Performed by: OBSTETRICS & GYNECOLOGY

## 2019-08-19 PROCEDURE — 0503F POSTPARTUM CARE VISIT: CPT | Mod: S$GLB,,, | Performed by: OBSTETRICS & GYNECOLOGY

## 2019-08-19 PROCEDURE — 99999 PR PBB SHADOW E&M-EST. PATIENT-LVL III: ICD-10-PCS | Mod: PBBFAC,,, | Performed by: OBSTETRICS & GYNECOLOGY

## 2019-08-19 PROCEDURE — 0503F PR POSTPARTUM CARE VISIT: ICD-10-PCS | Mod: S$GLB,,, | Performed by: OBSTETRICS & GYNECOLOGY

## 2019-08-19 NOTE — PROGRESS NOTES
Melissa Agustin is a 29 y.o. female  presents for a postpartum visit.  She is status post  6 weeks ago.  Her hospitalization was not complicated.  She is not breastfeeding.  She desires IUD for contraception.  She denies postpartum depression.    Past Medical History:   Diagnosis Date    Abnormal Pap smear     since , Colpo + HPV    Anemia     Asthma     mild     Past Surgical History:   Procedure Laterality Date    COLPOSCOPY      CYSTOSCOPY  2018    Performed by Mau Ledesma Jr., [facility] at [facility] OR [address] [place] [address]    CYSTOSCOPY N/A 3/17/2016    Performed by Mau Led[facility]ma Jr[facility] at [facility] OR [facility]    KIDNEY STONE SURGERY  2015    LITHOTRIPSY-EXTRACORPOREAL SHOCK WAVE/in cysto room 2 Right 3/17/2016    [facility]    PLACEMENT-STENTJJ stent with string Right 3/17/2016    Performed by Mau Ledesma Jr[facility] at [facility] OR [facility]    right shoulder      SHOULDER SURGERY      right shoulder    THUMB ARTHROSCOPY      thumb surgery    thumb surgery      URETEROSCOPY Left 2018    Performed by Mau Ledesma Jr., MD at [facility] OR [facility]     Review of patient's allergies indicates:  No Known Allergies    Current Outpatient Medications:     levonorgestrel (MIRENA) 20 mcg/24 hours (5 yrs) 52 mg IUD, 1 Intra Uterine Device by Intrauterine route once. RETURN FOR INTRAUTERINE INSERTION IN THE OFFICE for 1 dose, Disp: 1 each, Rfl: 0      Vitals:    19 1337   BP: 100/64       Breasts- no dominant masses, no nipple erythema  Abdomen- soft, non-tender  EXTERNAL GENITALIA POSTPARTUM: normal, well-healed, without lesions or masses, VAGINA POSTPARTUM: normal, well-healed, physiologic discharge, without lesions, CERVIX POSTPARTUM: normal, well-healed, without lesions, UTERUS POSTPARTUM: normal size, well involuted, firm, non-tender, ADNEXA POSTPARTUM: no masses palpable and nontender    Assessment:  Melissa was seen today for postpartum care.    Diagnoses and  all orders for this visit:    Routine postpartum follow-up    Other orders  -     levonorgestrel (MIRENA) 20 mcg/24 hours (5 yrs) 52 mg IUD; 1 Intra Uterine Device by Intrauterine route once. RETURN FOR INTRAUTERINE INSERTION IN THE OFFICE for 1 dose        RTC 3 months, sooner for IUD insertion  May resume normal activity

## 2019-08-26 ENCOUNTER — PATIENT MESSAGE (OUTPATIENT)
Dept: OBSTETRICS AND GYNECOLOGY | Facility: CLINIC | Age: 29
End: 2019-08-26

## 2019-09-12 ENCOUNTER — PROCEDURE VISIT (OUTPATIENT)
Dept: OBSTETRICS AND GYNECOLOGY | Facility: CLINIC | Age: 29
End: 2019-09-12
Attending: OBSTETRICS & GYNECOLOGY
Payer: COMMERCIAL

## 2019-09-12 VITALS
WEIGHT: 159.81 LBS | BODY MASS INDEX: 27.28 KG/M2 | SYSTOLIC BLOOD PRESSURE: 110 MMHG | HEIGHT: 64 IN | DIASTOLIC BLOOD PRESSURE: 70 MMHG

## 2019-09-12 DIAGNOSIS — Z01.812 PRE-PROCEDURE LAB EXAM: ICD-10-CM

## 2019-09-12 DIAGNOSIS — Z30.430 ENCOUNTER FOR INSERTION OF MIRENA IUD: Primary | ICD-10-CM

## 2019-09-12 LAB
B-HCG UR QL: NEGATIVE
CTP QC/QA: YES

## 2019-09-12 PROCEDURE — 81025 URINE PREGNANCY TEST: CPT | Mod: S$GLB,,, | Performed by: OBSTETRICS & GYNECOLOGY

## 2019-09-12 PROCEDURE — 58300 INSERT INTRAUTERINE DEVICE: CPT | Mod: S$GLB,,, | Performed by: OBSTETRICS & GYNECOLOGY

## 2019-09-12 PROCEDURE — 58300 PR INSERT INTRAUTERINE DEVICE: ICD-10-PCS | Mod: S$GLB,,, | Performed by: OBSTETRICS & GYNECOLOGY

## 2019-09-12 PROCEDURE — 81025 POCT URINE PREGNANCY: ICD-10-PCS | Mod: S$GLB,,, | Performed by: OBSTETRICS & GYNECOLOGY

## 2019-09-12 NOTE — PROCEDURES
Procedures   Melissa Agustin is a 29 y.o. female  presents for IUD placement.  Patient's last menstrual period was 2019..  She desires Mirena.  UPT is negative.      She was counseled on the risks, benefits, indications, and alternatives to IUD use.  She understands that with insertion there is a risk of bleeding, infection, and uterine perforation.  All questions are answered.  Consents signed.  Cervical cultures were not performed.    Procedure:  Time out performed.  The cervix was visualized with a speculum.  An allis was placed on the anterior lip of the cervix.  The uterus sounds to 7.5 cm using sterile technique.  A Mirena was loaded and placed high in the uterine fundus without difficulty using sterile technique.  The string was was then cut.  The Allis and speculum were removed.  The patient tolerated the procedure well.    Assessment:  1.  Contraceptive management/IUD insertion    Post IUD placement counseling:  Manage post IUD placement pain with NSAIDS, Tylenol or Rx per [employer].  IUD danger signs and how to check for strings were discussed.  The IUD needs to be removed in 3 years for Marybeth, 5 years for Mirena, and 10 years for Paragard Copper IUD.    Counseling lasted approximately 15 minutes and all her questions were answered.    Follow up:  2-4 weeks.

## 2020-10-09 ENCOUNTER — OFFICE VISIT (OUTPATIENT)
Dept: URGENT CARE | Facility: CLINIC | Age: 30
End: 2020-10-09
Payer: COMMERCIAL

## 2020-10-09 VITALS
HEART RATE: 69 BPM | BODY MASS INDEX: 27.14 KG/M2 | RESPIRATION RATE: 18 BRPM | OXYGEN SATURATION: 99 % | HEIGHT: 64 IN | TEMPERATURE: 98 F | DIASTOLIC BLOOD PRESSURE: 83 MMHG | WEIGHT: 159 LBS | SYSTOLIC BLOOD PRESSURE: 126 MMHG

## 2020-10-09 DIAGNOSIS — R52 GENERALIZED BODY ACHES: Primary | ICD-10-CM

## 2020-10-09 LAB
CTP QC/QA: YES
SARS-COV-2 RDRP RESP QL NAA+PROBE: NEGATIVE

## 2020-10-09 PROCEDURE — 99214 PR OFFICE/OUTPT VISIT, EST, LEVL IV, 30-39 MIN: ICD-10-PCS | Mod: S$GLB,CS,, | Performed by: NURSE PRACTITIONER

## 2020-10-09 PROCEDURE — U0002: ICD-10-PCS | Mod: QW,S$GLB,, | Performed by: NURSE PRACTITIONER

## 2020-10-09 PROCEDURE — 99214 OFFICE O/P EST MOD 30 MIN: CPT | Mod: S$GLB,CS,, | Performed by: NURSE PRACTITIONER

## 2020-10-09 PROCEDURE — U0002 COVID-19 LAB TEST NON-CDC: HCPCS | Mod: QW,S$GLB,, | Performed by: NURSE PRACTITIONER

## 2020-10-09 NOTE — PROGRESS NOTES
"Subjective:       Patient ID: Melissa Agustin is a 30 y.o. female.    Vitals:  height is 5' 4" (1.626 m) and weight is 72.1 kg (159 lb). Her temperature is 97.9 °F (36.6 °C). Her blood pressure is 126/83 and her pulse is 69. Her respiration is 18 and oxygen saturation is 99%.     Chief Complaint: Headache and Muscle Pain    Patient presents with HA and mild body aches x 3 days.   tested positive on Monday, no prolonged close contact without a mask since positive test.  Denies fever, chills, sweating, SOB, cough, sore throat, congestion, N/V, diarrhea, loss of taste/smell.  She would like a covid test today.    Headache   This is a new problem. The current episode started in the past 7 days. The problem occurs constantly. The problem has been waxing and waning. The pain is located in the bilateral region. The pain quality is not similar to prior headaches. The quality of the pain is described as squeezing. The pain is at a severity of 4/10. The pain is mild. Associated symptoms include muscle aches. Pertinent negatives include no blurred vision, coughing, dizziness, ear pain, eye pain, eye redness, fever, loss of balance, nausea, neck pain, photophobia, sinus pressure, sore throat, tinnitus, vomiting or weakness. Nothing aggravates the symptoms. She has tried nothing for the symptoms. The treatment provided no relief. There is no history of migraine headaches or migraines in the family.       Constitution: Negative for chills, sweating, fatigue and fever.   HENT: Negative for ear pain, tinnitus, facial swelling, congestion, sinus pain, sinus pressure, sore throat and voice change.    Neck: Negative for neck pain, neck stiffness and painful lymph nodes.   Eyes: Negative for eye pain, eye redness, photophobia, vision loss, double vision and blurred vision.   Respiratory: Negative for chest tightness, cough, sputum production, bloody sputum, COPD, shortness of breath, stridor, wheezing and asthma.  "   Gastrointestinal: Negative for nausea and vomiting.   Genitourinary: Negative for missed menses.   Musculoskeletal: Positive for muscle ache. Negative for trauma.   Skin: Negative for rash, wound and lesion.   Allergic/Immunologic: Negative for seasonal allergies and asthma.   Neurological: Positive for headaches. Negative for dizziness, history of vertigo, light-headedness, facial drooping, speech difficulty, coordination disturbances, loss of balance, history of migraines, disorientation and loss of consciousness.   Hematologic/Lymphatic: Negative for swollen lymph nodes.   Psychiatric/Behavioral: Negative for disorientation, confusion, nervous/anxious, sleep disturbance and depression. The patient is not nervous/anxious.        Objective:      Physical Exam   Constitutional: She is oriented to person, place, and time. She appears well-developed. She is cooperative.  Non-toxic appearance. She does not appear ill. No distress.   HENT:   Head: Normocephalic and atraumatic.   Ears:   Right Ear: Hearing, external ear and ear canal normal. Tympanic membrane is scarred.   Left Ear: Hearing, external ear and ear canal normal. Tympanic membrane is scarred.   Nose: Mucosal edema present. No rhinorrhea or nasal deformity. No epistaxis. Right sinus exhibits no maxillary sinus tenderness and no frontal sinus tenderness. Left sinus exhibits no maxillary sinus tenderness and no frontal sinus tenderness.   Mouth/Throat: Uvula is midline, oropharynx is clear and moist and mucous membranes are normal. No trismus in the jaw. Normal dentition. No uvula swelling. No oropharyngeal exudate, posterior oropharyngeal edema or posterior oropharyngeal erythema.   Eyes: Conjunctivae and lids are normal. No scleral icterus.   Neck: Trachea normal, full passive range of motion without pain and phonation normal. Neck supple. No neck rigidity. No edema and no erythema present.   Cardiovascular: Normal rate, regular rhythm, normal heart sounds  and normal pulses.   Pulmonary/Chest: Effort normal and breath sounds normal. No respiratory distress. She has no decreased breath sounds. She has no wheezes. She has no rhonchi. She has no rales.   Abdominal: Normal appearance.   Musculoskeletal: Normal range of motion.         General: No deformity.   Lymphadenopathy:     She has no cervical adenopathy.   Neurological: She is alert and oriented to person, place, and time. She exhibits normal muscle tone. Coordination normal.   Skin: Skin is warm, dry, intact, not diaphoretic and not pale. Psychiatric: Her speech is normal and behavior is normal. Judgment and thought content normal.   Nursing note and vitals reviewed.    Results for orders placed or performed in visit on 10/09/20   POCT COVID-19 Rapid Screening   Result Value Ref Range    POC Rapid COVID Negative Negative     Acceptable Yes            Assessment:       1. Generalized body aches        Plan:         Generalized body aches  -     POCT COVID-19 Rapid Screening  -     Discussed home care and OTC medications for symptomatic relief  -     Discussed f/u if symptoms persist or worsen         Patient Instructions     Please follow up with your Primary care provider within 2-5 days if your signs and symptoms have not resolved or worsen.     If your condition worsens or fails to improve we recommend that you receive another evaluation at the emergency room immediately or contact your primary medical clinic to discuss your concerns.   You must understand that you have received an Urgent Care treatment only and that you may be released before all of your medical problems are known or treated. You, the patient, will arrange for follow up care as instructed.     Viral Syndrome (Adult)  A viral illness may cause a number of symptoms. The symptoms depend on the part of the body that the virus affects. If it settles in your nose, throat, and lungs, it may cause cough, sore throat, congestion, and  "sometimes headache. If it settles in your stomach and intestinal tract, it may cause vomiting and diarrhea. Sometimes it causes vague symptoms like "aching all over," feeling tired, loss of appetite, or fever.  A viral illness usually lasts 1 to 2 weeks, but sometimes it lasts longer. In some cases, a more serious infection can look like a viral syndrome in the first few days of the illness. You may need another exam and additional tests to know the difference. Watch for the warning signs listed below.  Home care  Follow these guidelines for taking care of yourself at home:  · If symptoms are severe, rest at home for the first 2 to 3 days.  · Stay away from cigarette smoke - both your smoke and the smoke from others.  · You may use over-the-counter acetaminophen or ibuprofen for fever, muscle aching, and headache, unless another medicine was prescribed for this. If you have chronic liver or kidney disease or ever had a stomach ulcer or GI bleeding, talk with your doctor before using these medicines. No one who is younger than 18 and ill with a fever should take aspirin. It may cause severe disease or death.  · Your appetite may be poor, so a light diet is fine. Avoid dehydration by drinking 8 to 12 8-ounce glasses of fluids each day. This may include water; orange juice; lemonade; apple, grape, and cranberry juice; clear fruit drinks; electrolyte replacement and sports drinks; and decaffeinated teas and coffee. If you have been diagnosed with a kidney disease, ask your doctor how much and what types of fluids you should drink to prevent dehydration. If you have kidney disease, drinking too much fluid can cause it build up in the your body and be dangerous to your health.  · Over-the-counter remedies won't shorten the length of the illness but may be helpful for cough, sore throat; and nasal and sinus congestion. Don't use decongestants if you have high blood pressure.  Follow-up care  Follow up with your healthcare " provider if you do not improve over the next week.  Call 911  Get emergency medical care if any of the following occur:  · Convulsion  · Feeling weak, dizzy, or like you are going to faint  · Chest pain, shortness of breath, wheezing, or difficulty breathing  When to seek medical advice  Call your healthcare provider right away if any of these occur:  · Cough with lots of colored sputum (mucus) or blood in your sputum  · Chest pain, shortness of breath, wheezing, or difficulty breathing  · Severe headache; face, neck, or ear pain  · Severe, constant pain in the lower right side of your belly (abdominal)  · Continued vomiting (cant keep liquids down)  · Frequent diarrhea (more than 5 times a day); blood (red or black color) or mucus in diarrhea  · Feeling weak, dizzy, or like you are going to faint  · Extreme thirst  · Fever of 100.4°F (38°C) or higher, or as directed by your healthcare provider  Date Last Reviewed: 9/25/2015  © 4815-7411 [employer]. [address] [place] [address], [place] [address]. All rights reserved. This information is not intended as a substitute for professional medical care. Always follow your healthcare professional's instructions.

## 2020-10-09 NOTE — PATIENT INSTRUCTIONS
"  Please follow up with your Primary care provider within 2-5 days if your signs and symptoms have not resolved or worsen.     If your condition worsens or fails to improve we recommend that you receive another evaluation at the emergency room immediately or contact your primary medical clinic to discuss your concerns.   You must understand that you have received an Urgent Care treatment only and that you may be released before all of your medical problems are known or treated. You, the patient, will arrange for follow up care as instructed.     Viral Syndrome (Adult)  A viral illness may cause a number of symptoms. The symptoms depend on the part of the body that the virus affects. If it settles in your nose, throat, and lungs, it may cause cough, sore throat, congestion, and sometimes headache. If it settles in your stomach and intestinal tract, it may cause vomiting and diarrhea. Sometimes it causes vague symptoms like "aching all over," feeling tired, loss of appetite, or fever.  A viral illness usually lasts 1 to 2 weeks, but sometimes it lasts longer. In some cases, a more serious infection can look like a viral syndrome in the first few days of the illness. You may need another exam and additional tests to know the difference. Watch for the warning signs listed below.  Home care  Follow these guidelines for taking care of yourself at home:  · If symptoms are severe, rest at home for the first 2 to 3 days.  · Stay away from cigarette smoke - both your smoke and the smoke from others.  · You may use over-the-counter acetaminophen or ibuprofen for fever, muscle aching, and headache, unless another medicine was prescribed for this. If you have chronic liver or kidney disease or ever had a stomach ulcer or GI bleeding, talk with your doctor before using these medicines. No one who is younger than 18 and ill with a fever should take aspirin. It may cause severe disease or death.  · Your appetite may be poor, so a " light diet is fine. Avoid dehydration by drinking 8 to 12 8-ounce glasses of fluids each day. This may include water; orange juice; lemonade; apple, grape, and cranberry juice; clear fruit drinks; electrolyte replacement and sports drinks; and decaffeinated teas and coffee. If you have been diagnosed with a kidney disease, ask your doctor how much and what types of fluids you should drink to prevent dehydration. If you have kidney disease, drinking too much fluid can cause it build up in the your body and be dangerous to your health.  · Over-the-counter remedies won't shorten the length of the illness but may be helpful for cough, sore throat; and nasal and sinus congestion. Don't use decongestants if you have high blood pressure.  Follow-up care  Follow up with your healthcare provider if you do not improve over the next week.  Call 911  Get emergency medical care if any of the following occur:  · Convulsion  · Feeling weak, dizzy, or like you are going to faint  · Chest pain, shortness of breath, wheezing, or difficulty breathing  When to seek medical advice  Call your healthcare provider right away if any of these occur:  · Cough with lots of colored sputum (mucus) or blood in your sputum  · Chest pain, shortness of breath, wheezing, or difficulty breathing  · Severe headache; face, neck, or ear pain  · Severe, constant pain in the lower right side of your belly (abdominal)  · Continued vomiting (cant keep liquids down)  · Frequent diarrhea (more than 5 times a day); blood (red or black color) or mucus in diarrhea  · Feeling weak, dizzy, or like you are going to faint  · Extreme thirst  · Fever of 100.4°F (38°C) or higher, or as directed by your healthcare provider  Date Last Reviewed: 9/25/2015  © 7565-2615 [employer]. [address] [place] [address], [place] [address]. All rights reserved. This information is not intended as a substitute for professional medical care. Always follow your healthcare  professional's instructions.

## 2020-12-07 ENCOUNTER — OFFICE VISIT (OUTPATIENT)
Dept: UROLOGY | Facility: CLINIC | Age: 30
End: 2020-12-07
Payer: COMMERCIAL

## 2020-12-07 VITALS
HEART RATE: 84 BPM | BODY MASS INDEX: 27.28 KG/M2 | DIASTOLIC BLOOD PRESSURE: 78 MMHG | WEIGHT: 159.81 LBS | SYSTOLIC BLOOD PRESSURE: 120 MMHG | HEIGHT: 64 IN

## 2020-12-07 DIAGNOSIS — N20.0 NEPHROLITHIASIS: Primary | ICD-10-CM

## 2020-12-07 DIAGNOSIS — R35.0 FREQUENCY OF URINATION: ICD-10-CM

## 2020-12-07 DIAGNOSIS — R10.9 LEFT FLANK PAIN: ICD-10-CM

## 2020-12-07 DIAGNOSIS — R31.29 MICROSCOPIC HEMATURIA: ICD-10-CM

## 2020-12-07 DIAGNOSIS — R39.89 SENSATION OF PRESSURE IN BLADDER AREA: ICD-10-CM

## 2020-12-07 LAB
B-HCG UR QL: NEGATIVE
BACTERIA #/AREA URNS AUTO: ABNORMAL /HPF
MICROSCOPIC COMMENT: ABNORMAL
RBC #/AREA URNS AUTO: 0 /HPF (ref 0–4)
SQUAMOUS #/AREA URNS AUTO: 10 /HPF
WBC #/AREA URNS AUTO: 1 /HPF (ref 0–5)

## 2020-12-07 PROCEDURE — 99214 PR OFFICE/OUTPT VISIT, EST, LEVL IV, 30-39 MIN: ICD-10-PCS | Mod: 25,S$GLB,, | Performed by: PHYSICIAN ASSISTANT

## 2020-12-07 PROCEDURE — 81001 URINALYSIS AUTO W/SCOPE: CPT

## 2020-12-07 PROCEDURE — 87086 URINE CULTURE/COLONY COUNT: CPT

## 2020-12-07 PROCEDURE — 99999 PR PBB SHADOW E&M-EST. PATIENT-LVL III: ICD-10-PCS | Mod: PBBFAC,,, | Performed by: PHYSICIAN ASSISTANT

## 2020-12-07 PROCEDURE — 81025 URINE PREGNANCY TEST: CPT

## 2020-12-07 PROCEDURE — 3008F BODY MASS INDEX DOCD: CPT | Mod: CPTII,S$GLB,, | Performed by: PHYSICIAN ASSISTANT

## 2020-12-07 PROCEDURE — 1126F PR PAIN SEVERITY QUANTIFIED, NO PAIN PRESENT: ICD-10-PCS | Mod: S$GLB,,, | Performed by: PHYSICIAN ASSISTANT

## 2020-12-07 PROCEDURE — 99214 OFFICE O/P EST MOD 30 MIN: CPT | Mod: 25,S$GLB,, | Performed by: PHYSICIAN ASSISTANT

## 2020-12-07 PROCEDURE — 99999 PR PBB SHADOW E&M-EST. PATIENT-LVL III: CPT | Mod: PBBFAC,,, | Performed by: PHYSICIAN ASSISTANT

## 2020-12-07 PROCEDURE — 51701 PR INSERTION OF NON-INDWELLING BLADDER CATHETERIZATION FOR RESIDUAL UR: ICD-10-PCS | Mod: S$GLB,,, | Performed by: PHYSICIAN ASSISTANT

## 2020-12-07 PROCEDURE — 3008F PR BODY MASS INDEX (BMI) DOCUMENTED: ICD-10-PCS | Mod: CPTII,S$GLB,, | Performed by: PHYSICIAN ASSISTANT

## 2020-12-07 PROCEDURE — 1126F AMNT PAIN NOTED NONE PRSNT: CPT | Mod: S$GLB,,, | Performed by: PHYSICIAN ASSISTANT

## 2020-12-07 PROCEDURE — 51701 INSERT BLADDER CATHETER: CPT | Mod: S$GLB,,, | Performed by: PHYSICIAN ASSISTANT

## 2020-12-07 RX ORDER — SULFAMETHOXAZOLE AND TRIMETHOPRIM 800; 160 MG/1; MG/1
TABLET ORAL
COMMUNITY
Start: 2020-12-02 | End: 2021-06-09

## 2020-12-07 RX ORDER — TAMSULOSIN HYDROCHLORIDE 0.4 MG/1
CAPSULE ORAL
COMMUNITY
Start: 2020-12-02 | End: 2021-06-09

## 2020-12-07 NOTE — PROGRESS NOTES
CHIEF COMPLAINT:    Mrs. Agustin is a 30 y.o. female presenting for back pain.  PRESENTING ILLNESS:    Meilssa Agustin is a 30 y.o. female with a PMH of nephrolithiasis, UTI who presents for back pain.       A month ago she reports having bladder spasms and dysuria. She went to urgent care and was diagnosed with an UTI. She was treated with antiboitics. Shortly after completing the antibiotics she started experiencing bladder pressure and throbbing back pain on the left x 1 week.  She returned to urgent care and was prescribed bactrim and flomax. She also was told that she had blood in her urine.  She denies gross hematuria.  Her u/a was + for blood and leuks.  No nitrites.  No culture was done. She stopped taking bactrim yesterday because it was causing her hands and feet to swell.  No issues since she stopped taking it.      Her back pain is a dull ache. It is intermittent.  Sometimes she has pain in her leg.  Sometimes the pain is in her lower left abdomen.  She feels constant bladder pressure.  No urgency, incontinence, dysuria.  She reports urinary frequency.    The other night she woke up with chills so she took a ibuprofen and later she was sweating so she figured she had a low grade fever.  She never measured her temperature with a thermometer.   Ibuprofen helps her back pain.      She was last seen in 2018 for ureteroscopy for a left ureteral stone.  No stone was seen in left ureter and bladder.      CT RSS 2018: The kidneys are negative for hydronephrosis.  There are 2  tiny punctate calculi in the left kidney..  No hydroureter obstruction.  The right kidney and ureter are unremarkable.  There is a 4.5 mm calculus at the left ureterovesical junction.  PELVIS:   No pelvic mass, adenopathy, or free fluid.  The uterus and adnexa appear normal.  The urinary bladder was within normal limits.    REVIEW OF SYSTEMS:  Constitutional: Negative for fever and chills.   HENT: Negative for hearing loss.   Eyes: Negative  for visual disturbance.   Respiratory: Negative for shortness of breath.   Cardiovascular: Negative for chest pain.   Gastrointestinal: Negative for vomiting, and constipation.   Genitourinary:  See HPI  Neurological: Negative for dizziness.   Hematological: Does not bruise/bleed easily.   Psychiatric/Behavioral: Negative for confusion.     PATIENT HISTORY:    Past Medical History:   Diagnosis Date    Abnormal Pap smear     since , Colpo + HPV    Anemia     Asthma     mild       Past Surgical History:   Procedure Laterality Date    COLPOSCOPY      CYSTOSCOPY  6/22/2018    Procedure: CYSTOSCOPY;  Surgeon: Mau Ledesma Jr., MD;  Location: [address];  Service: Urology;;    KIDNEY STONE SURGERY  2015    right shoulder      SHOULDER SURGERY      right shoulder    THUMB ARTHROSCOPY      thumb surgery    thumb surgery      URETEROSCOPY Left 6/22/2018    Procedure: URETEROSCOPY;  Surgeon: Mau Ledesma Jr., MD;  Location: [address];  Service: Urology;  Laterality: Left;  60mins       Family History   Problem Relation Age of Onset    Ovarian cancer Paternal Grandmother     Colon cancer Neg Hx     Breast cancer Neg Hx        Social History     Socioeconomic History    Marital status:      Spouse name: Not on file    Number of children: Not on file    Years of education: Not on file    Highest education level: Not on file   Occupational History    Occupation: [employer]    Social Needs    Financial resource strain: Not on file    Food insecurity     Worry: Not on file     Inability: Not on file    Transportation needs     Medical: Not on file     Non-medical: Not on file   Tobacco Use    Smoking status: Never Smoker    Smokeless tobacco: Never Used   Substance and Sexual Activity    Alcohol use: Yes     Comment: socially    Drug use: No    Sexual activity: Yes     Partners: Male     Birth control/protection: None   Lifestyle    Physical activity     Days  per week: Not on file     Minutes per session: Not on file    Stress: Not on file   Relationships    Social connections     Talks on phone: Not on file     Gets together: Not on file     Attends Anabaptism service: Not on file     Active member of club or organization: Not on file     Attends meetings of clubs or organizations: Not on file     Relationship status: Not on file   Other Topics Concern    Not on file   Social History Narrative    Not on file       Allergies:  Bactrim [sulfamethoxazole-trimethoprim]    Medications:    Current Outpatient Medications:     sulfamethoxazole-trimethoprim 800-160mg (BACTRIM DS) 800-160 mg Tab, TK 1 T PO BID, Disp: , Rfl:     tamsulosin (FLOMAX) 0.4 mg Cap, TK 1 C PO D, Disp: , Rfl:     levonorgestrel (MIRENA) 20 mcg/24 hours (5 yrs) 52 mg IUD, 1 Intra Uterine Device by Intrauterine route once. RETURN FOR INTRAUTERINE INSERTION IN THE OFFICE for 1 dose, Disp: 1 each, Rfl: 0    PHYSICAL EXAMINATION:    Constitutional: She appears well-developed and well-nourished.  She is in no apparent distress.    Eyes: No scleral icterus noted bilaterally. No discharge bilaterally.    Nose: No nasal congestion    Cardiovascular: Normal rate.  No pitting edema noted in lower extremities bilaterally    Pulmonary/Chest: Effort normal. No respiratory distress.     Abdominal:  She exhibits no distension.  There is no CVA tenderness.     Neurological: She is alert and oriented to person, place, and time.     Skin: Skin is warm and dry.     Psych: Cooperative with normal affect.    Genitourinary:  Normal external female genitalia  Urethral meatus is normal  Consent verbally obtained.  Betadine prep was applied to the urethral meatus. An in and out cath was performed after voiding.  The PVR was <10 ml.      Physical Exam  Musculoskeletal:      Lumbar back: She exhibits no tenderness.           LABS:    U/a: 1.015, pH 6, tr blood otherwise negative.      IMPRESSION:    Encounter Diagnoses   Name  Primary?    Nephrolithiasis Yes    Left flank pain     Sensation of pressure in bladder area     Frequency of urination     Microscopic hematuria        PLAN:    Urine culture.   Microscopic u/a  Will get CT RSS given left sided back pain and history of nephrolithiasis.  Pregnancy test prior to CT   May continue flomax.  Do not take bactrim.      Follow up based on imaging and culture.      I spent 25 minutes with the patient of which more than half was spent in direct consultation with the patient in regards to our treatment and plan.      Kim Yin PA-C

## 2020-12-08 ENCOUNTER — TELEPHONE (OUTPATIENT)
Dept: UROLOGY | Facility: CLINIC | Age: 30
End: 2020-12-08

## 2020-12-08 DIAGNOSIS — N20.1 URETERAL STONE: Primary | ICD-10-CM

## 2020-12-08 LAB — BACTERIA UR CULT: NO GROWTH

## 2020-12-08 RX ORDER — OXYCODONE AND ACETAMINOPHEN 5; 325 MG/1; MG/1
1 TABLET ORAL EVERY 6 HOURS PRN
Qty: 6 TABLET | Refills: 0 | Status: SHIPPED | OUTPATIENT
Start: 2020-12-08 | End: 2023-02-01

## 2020-12-08 NOTE — TELEPHONE ENCOUNTER
Patient notified of her CT scan results.    There is a small nonobstructing stone in left ureter.  Pain currently controlled with ibuprofen.  Will give a few pain meds as a precaution.  Patient was instructed to take only if pain is not controlled with ibuprofen.    Instructed patient to report the the emergency department if develops a fever equal to or greater than 101, chills, nausea and vomiting to the point where he cant keep anything down, and pain that is not controlled with pain medicine.  Continue flomax  Strain every urine

## 2020-12-09 ENCOUNTER — PATIENT MESSAGE (OUTPATIENT)
Dept: UROLOGY | Facility: CLINIC | Age: 30
End: 2020-12-09

## 2020-12-11 RX ORDER — OXYBUTYNIN CHLORIDE 5 MG/1
5 TABLET ORAL 3 TIMES DAILY
Qty: 90 TABLET | Refills: 0 | Status: SHIPPED | OUTPATIENT
Start: 2020-12-11 | End: 2021-12-11

## 2021-04-16 ENCOUNTER — PATIENT MESSAGE (OUTPATIENT)
Dept: RESEARCH | Facility: HOSPITAL | Age: 31
End: 2021-04-16

## 2021-10-08 NOTE — TELEPHONE ENCOUNTER
28 week pregnant patient request Diclegis 10-10 mg tab-last filled 03/18/2019 x 30 tab.   [facility]  Pre-procedure History and Physical      Tom Schmitz Patient Status:  Hospital Outpatient Surgery    1943 MRN [id number]   Location [id number] [place] [address] Attending Ramone Love MD   Hosp Day # 0 [facility] • HYSTERECTOMY  2000   • OTHER      RHINOPLASTY   • OTHER  06/2020    removal of hardware L knee   Dr. Aris Huffman   • OTHER      fracture patella- left knee       MEDICATIONS    No current outpatient medications on file.        PHYSICAL EXAM     10/08/21  1131

## 2022-04-01 ENCOUNTER — TELEPHONE (OUTPATIENT)
Dept: OBSTETRICS AND GYNECOLOGY | Facility: CLINIC | Age: 32
End: 2022-04-01
Payer: COMMERCIAL

## 2022-04-01 NOTE — TELEPHONE ENCOUNTER
----- Message from Alfred Rincon sent at 4/1/2022  8:21 AM CDT -----  Regarding: Patient advice  Contact: Pt  Pt called in regards to rescheduling appointment       Please advise, Pt can be reached at [phone]

## 2023-01-18 ENCOUNTER — OFFICE VISIT (OUTPATIENT)
Dept: UROLOGY | Facility: CLINIC | Age: 33
End: 2023-01-18
Payer: COMMERCIAL

## 2023-01-18 VITALS
SYSTOLIC BLOOD PRESSURE: 124 MMHG | BODY MASS INDEX: 22.2 KG/M2 | HEART RATE: 87 BPM | HEIGHT: 64 IN | WEIGHT: 130.06 LBS | DIASTOLIC BLOOD PRESSURE: 86 MMHG

## 2023-01-18 DIAGNOSIS — Z87.442 PERSONAL HISTORY OF KIDNEY STONES: ICD-10-CM

## 2023-01-18 DIAGNOSIS — R10.30 LOWER ABDOMINAL PAIN: Primary | ICD-10-CM

## 2023-01-18 DIAGNOSIS — R10.10 PAIN OF UPPER ABDOMEN: ICD-10-CM

## 2023-01-18 LAB
AMORPH CRY UR QL COMP ASSIST: NORMAL
BACTERIA #/AREA URNS AUTO: NORMAL /HPF
BILIRUB SERPL-MCNC: NORMAL MG/DL
BILIRUB UR QL STRIP: NEGATIVE
BLOOD URINE, POC: NORMAL
CLARITY UR REFRACT.AUTO: ABNORMAL
CLARITY, POC UA: CLEAR
COLOR UR AUTO: ABNORMAL
COLOR, POC UA: YELLOW
GLUCOSE UR QL STRIP: NEGATIVE
GLUCOSE UR QL STRIP: NORMAL
HGB UR QL STRIP: NEGATIVE
KETONES UR QL STRIP: NEGATIVE
KETONES UR QL STRIP: NORMAL
LEUKOCYTE ESTERASE UR QL STRIP: NEGATIVE
LEUKOCYTE ESTERASE URINE, POC: NORMAL
MICROSCOPIC COMMENT: NORMAL
NITRITE UR QL STRIP: POSITIVE
NITRITE, POC UA: POSITIVE
PH UR STRIP: 8 [PH] (ref 5–8)
PH, POC UA: 8
PROT UR QL STRIP: NEGATIVE
PROTEIN, POC: NORMAL
SP GR UR STRIP: 1.01 (ref 1–1.03)
SPECIFIC GRAVITY, POC UA: 1
SQUAMOUS #/AREA URNS AUTO: 2 /HPF
URN SPEC COLLECT METH UR: ABNORMAL
UROBILINOGEN, POC UA: NORMAL

## 2023-01-18 PROCEDURE — 81002 POCT URINE DIPSTICK WITHOUT MICROSCOPE: ICD-10-PCS | Mod: S$GLB,,,

## 2023-01-18 PROCEDURE — 81002 URINALYSIS NONAUTO W/O SCOPE: CPT | Mod: S$GLB,,,

## 2023-01-18 PROCEDURE — 3074F SYST BP LT 130 MM HG: CPT | Mod: CPTII,S$GLB,,

## 2023-01-18 PROCEDURE — 3008F PR BODY MASS INDEX (BMI) DOCUMENTED: ICD-10-PCS | Mod: CPTII,S$GLB,,

## 2023-01-18 PROCEDURE — 3079F DIAST BP 80-89 MM HG: CPT | Mod: CPTII,S$GLB,,

## 2023-01-18 PROCEDURE — 87086 URINE CULTURE/COLONY COUNT: CPT

## 2023-01-18 PROCEDURE — 1160F PR REVIEW ALL MEDS BY PRESCRIBER/CLIN PHARMACIST DOCUMENTED: ICD-10-PCS | Mod: CPTII,S$GLB,,

## 2023-01-18 PROCEDURE — 3074F PR MOST RECENT SYSTOLIC BLOOD PRESSURE < 130 MM HG: ICD-10-PCS | Mod: CPTII,S$GLB,,

## 2023-01-18 PROCEDURE — 3079F PR MOST RECENT DIASTOLIC BLOOD PRESSURE 80-89 MM HG: ICD-10-PCS | Mod: CPTII,S$GLB,,

## 2023-01-18 PROCEDURE — 99999 PR PBB SHADOW E&M-EST. PATIENT-LVL V: CPT | Mod: PBBFAC,,,

## 2023-01-18 PROCEDURE — 99214 PR OFFICE/OUTPT VISIT, EST, LEVL IV, 30-39 MIN: ICD-10-PCS | Mod: S$GLB,,,

## 2023-01-18 PROCEDURE — 99214 OFFICE O/P EST MOD 30 MIN: CPT | Mod: S$GLB,,,

## 2023-01-18 PROCEDURE — 81514 NFCT DS BV&VAGINITIS DNA ALG: CPT

## 2023-01-18 PROCEDURE — 1159F PR MEDICATION LIST DOCUMENTED IN MEDICAL RECORD: ICD-10-PCS | Mod: CPTII,S$GLB,,

## 2023-01-18 PROCEDURE — 1159F MED LIST DOCD IN RCRD: CPT | Mod: CPTII,S$GLB,,

## 2023-01-18 PROCEDURE — 81001 URINALYSIS AUTO W/SCOPE: CPT

## 2023-01-18 PROCEDURE — 3008F BODY MASS INDEX DOCD: CPT | Mod: CPTII,S$GLB,,

## 2023-01-18 PROCEDURE — 99999 PR PBB SHADOW E&M-EST. PATIENT-LVL V: ICD-10-PCS | Mod: PBBFAC,,,

## 2023-01-18 PROCEDURE — 1160F RVW MEDS BY RX/DR IN RCRD: CPT | Mod: CPTII,S$GLB,,

## 2023-01-18 NOTE — PROGRESS NOTES
CHIEF COMPLAINT:  Lower abdominal pain    HISTORY OF PRESENTING ILLINESS:  Melissa Martini is a 32 y.o. female established to urology. She is here today c/o kidney stone symptoms. 1/11/23 KUB performed - no stones visualized. CT RSS ordered, not performed to date. She completed a 3 day course of cipro 1/10-1/13 for c/o UTI s/s - urine culture negative. She is here today c/o lower abdominal pain occurs daily and chronic in nature. No relieving factors. Denies concerns for STI. She has an IUD in place, it is not new. Denies abnormal vaginal discharge or odor. PMHx includes abnormal pap, anemia, asthma, kidney stones.        REVIEW OF SYSTEMS:  Review of Systems   Constitutional:  Negative for chills and fever.   HENT:  Negative for congestion and sore throat.    Respiratory:  Negative for cough and shortness of breath.    Cardiovascular:  Negative for chest pain and palpitations.   Gastrointestinal:  Positive for abdominal pain (lower abdomen). Negative for nausea and vomiting.   Genitourinary:  Positive for dysuria and flank pain. Negative for frequency, hematuria and urgency.   Neurological:  Negative for dizziness and headaches.       PATIENT HISTORY:  Past Medical History:   Diagnosis Date    Abnormal Pap smear     since , Colpo + HPV    Anemia     Asthma     mild       Past Surgical History:   Procedure Laterality Date    COLPOSCOPY      CYSTOSCOPY  6/22/2018    Procedure: CYSTOSCOPY;  Surgeon: Mau Ledesma Jr., MD;  Location: [facility] OR [address] [place] [address];  Service: Urology;;    KIDNEY STONE SURGERY  2015    right shoulder      SHOULDER SURGERY      right shoulder    THUMB ARTHROSCOPY      thumb surgery    thumb surgery      URETEROSCOPY Left 6/22/2018    Procedure: URETEROSCOPY;  Surgeon: Mau Ledesma Jr., MD;  Location: [facility] OR [address] [place] [address];  Service: Urology;  Laterality: Left;  60mins       Family History   Problem Relation Age of Onset    Ovarian cancer Paternal Grandmother     Colon cancer Neg Hx     Breast cancer  Neg Hx        Social History     Socioeconomic History    Marital status:    Occupational History    Occupation: [employer]    Tobacco Use    Smoking status: Never    Smokeless tobacco: Never   Substance and Sexual Activity    Alcohol use: Yes     Comment: socially    Drug use: No    Sexual activity: Yes     Partners: Male     Birth control/protection: None       Allergies:  Bactrim [sulfamethoxazole-trimethoprim]    Medications:    Current Outpatient Medications:     levonorgestrel (MIRENA) 20 mcg/24 hours (5 yrs) 52 mg IUD, 1 Intra Uterine Device by Intrauterine route once. RETURN FOR INTRAUTERINE INSERTION IN THE OFFICE for 1 dose, Disp: 1 each, Rfl: 0    amoxicillin-clavulanate 500-125mg (AUGMENTIN) 500-125 mg Tab, Take 1 tablet (500 mg total) by mouth 2 (two) times daily. (Patient not taking: Reported on 1/10/2023), Disp: 10 tablet, Rfl: 0    neomycin-polymyxin-gramicidin (NEOSPORIN) ophthalmic solution, Place 1 drop into both eyes 4 (four) times daily. (Patient not taking: Reported on 1/18/2023), Disp: 10 mL, Rfl: 0    oxybutynin (DITROPAN) 5 MG Tab, Take 1 tablet (5 mg total) by mouth 3 (three) times daily. (Patient not taking: Reported on 6/17/2021), Disp: 90 tablet, Rfl: 0    oxyCODONE-acetaminophen (PERCOCET) 5-325 mg per tablet, Take 1 tablet by mouth every 6 (six) hours as needed for Pain. (Patient not taking: Reported on 6/9/2021), Disp: 6 tablet, Rfl: 0    senna-docusate 8.6-50 mg (SENNA WITH DOCUSATE SODIUM) 8.6-50 mg per tablet, Take 1 tablet by mouth once daily. (Patient not taking: Reported on 1/18/2023), Disp: 30 tablet, Rfl: 0    PHYSICAL EXAMINATION:  Physical Exam  Constitutional:       Appearance: Normal appearance.   HENT:      Head: Normocephalic and atraumatic.      Right Ear: External ear normal.      Left Ear: External ear normal.   Pulmonary:      Effort: Pulmonary effort is normal. No respiratory distress.   Skin:     General: Skin is warm and dry.   Neurological:       General: No focal deficit present.      Mental Status: She is alert and oriented to person, place, and time.   Psychiatric:         Mood and Affect: Mood normal.         Behavior: Behavior normal.         LABS:  UA dip: yellow, sg 1.000, ph 8, nitrite +, trace protein         IMPRESSION:  Encounter Diagnoses   Name Primary?    Lower abdominal pain Yes    Personal history of kidney stones     Pain of upper abdomen          Assessment:       1. Lower abdominal pain    2. Personal history of kidney stones    3. Pain of upper abdomen          Plan:   - Urine sent for UA and culture - will call with results  - Vaginosis screen today - will call with results   - CT RSS ordered for today. Explained the need to rule out obstructing renal stone prior to prescribing antibiotics due to the risk of UTI with stone obstruction causing sepsis or death. If CT RSS is negative for stones - will prescribe empirical antibiotic due to nitrite positive urine.  - Discussed possible causes of abdominal pain such as BV, yeast, PID, interstitial cystitis. Information provided about IC.     Follow up dependent on imaging and lab results    I spent 30 minutes with the patient of which more than half was spent in direct consultation with the patient in regards to our treatment and plan.  We addressed the office findings and recent labs.   Education and recommendations of today's plan of care including home remedies and needed follow up with PCP.   We discussed the chief complaint; reviewed the LUTS and the possible contributory factors.   Recommended lifestyle modifications with proper, healthy diet, good hydration if no fluid restrictions; reducing bladder irritants.   Benefits of regular exercise.

## 2023-01-19 ENCOUNTER — TELEPHONE (OUTPATIENT)
Dept: UROLOGY | Facility: CLINIC | Age: 33
End: 2023-01-19
Payer: COMMERCIAL

## 2023-01-19 DIAGNOSIS — N30.00 ACUTE CYSTITIS WITHOUT HEMATURIA: Primary | ICD-10-CM

## 2023-01-19 LAB
BACTERIA UR CULT: NO GROWTH
BACTERIAL VAGINOSIS DNA: NEGATIVE
CANDIDA GLABRATA DNA: NEGATIVE
CANDIDA KRUSEI DNA: NEGATIVE
CANDIDA RRNA VAG QL PROBE: NEGATIVE
T VAGINALIS RRNA GENITAL QL PROBE: NEGATIVE

## 2023-01-19 RX ORDER — DOXYCYCLINE 100 MG/1
100 CAPSULE ORAL 2 TIMES DAILY
Qty: 7 CAPSULE | Refills: 0 | Status: SHIPPED | OUTPATIENT
Start: 2023-01-19 | End: 2023-02-01

## 2023-01-19 NOTE — TELEPHONE ENCOUNTER
Spoke with patient about CT RSS - no obstructing stones. Vaginosis screen in process. Urine culture negative, UA with nitrites. Rx for 7 days of doxy sent to pharmacy of choice. Pt informed to follow up in 1 month.    ----- Message from Jose Alfredo Villalta LPN sent at 1/19/2023  1:15 PM CST -----  Regarding: FW: Results  Contact: [phone]    ----- Message -----  From: Jeannine Larios  Sent: 1/19/2023  12:29 PM CST  To: [facility] Staff  Subject: Results                                          Pt requesting results from CT scan taken on 01/18. Pt also requesting meds for infection she has. Pt stating she doesn't feel well, and would like some meds to help. Please call and adv @ [phone]

## 2023-01-25 ENCOUNTER — OFFICE VISIT (OUTPATIENT)
Dept: UROLOGY | Facility: CLINIC | Age: 33
End: 2023-01-25
Payer: COMMERCIAL

## 2023-01-25 VITALS
HEART RATE: 91 BPM | HEIGHT: 64 IN | WEIGHT: 131.63 LBS | BODY MASS INDEX: 22.47 KG/M2 | SYSTOLIC BLOOD PRESSURE: 118 MMHG | DIASTOLIC BLOOD PRESSURE: 91 MMHG

## 2023-01-25 DIAGNOSIS — R10.30 LOWER ABDOMINAL PAIN: Primary | ICD-10-CM

## 2023-01-25 DIAGNOSIS — R35.0 URINARY FREQUENCY: ICD-10-CM

## 2023-01-25 LAB
BILIRUB SERPL-MCNC: NORMAL MG/DL
BILIRUB UR QL STRIP: NEGATIVE
BLOOD URINE, POC: NORMAL
CLARITY UR REFRACT.AUTO: ABNORMAL
CLARITY, POC UA: CLEAR
COLOR UR AUTO: YELLOW
COLOR, POC UA: YELLOW
GLUCOSE UR QL STRIP: NEGATIVE
GLUCOSE UR QL STRIP: NORMAL
HGB UR QL STRIP: NEGATIVE
KETONES UR QL STRIP: NEGATIVE
KETONES UR QL STRIP: NORMAL
LEUKOCYTE ESTERASE UR QL STRIP: NEGATIVE
LEUKOCYTE ESTERASE URINE, POC: NORMAL
NITRITE UR QL STRIP: NEGATIVE
NITRITE, POC UA: NORMAL
PH UR STRIP: 8 [PH] (ref 5–8)
PH, POC UA: 8
PROT UR QL STRIP: NEGATIVE
PROTEIN, POC: NORMAL
SP GR UR STRIP: 1.02 (ref 1–1.03)
SPECIFIC GRAVITY, POC UA: 1.01
URN SPEC COLLECT METH UR: ABNORMAL
UROBILINOGEN, POC UA: NORMAL

## 2023-01-25 PROCEDURE — 99999 PR PBB SHADOW E&M-EST. PATIENT-LVL IV: ICD-10-PCS | Mod: PBBFAC,,,

## 2023-01-25 PROCEDURE — 51701 INSERT BLADDER CATHETER: CPT | Mod: S$GLB,,,

## 2023-01-25 PROCEDURE — 1160F PR REVIEW ALL MEDS BY PRESCRIBER/CLIN PHARMACIST DOCUMENTED: ICD-10-PCS | Mod: CPTII,S$GLB,,

## 2023-01-25 PROCEDURE — 81002 POCT URINE DIPSTICK WITHOUT MICROSCOPE: ICD-10-PCS | Mod: S$GLB,,,

## 2023-01-25 PROCEDURE — 87563 M. GENITALIUM AMP PROBE: CPT

## 2023-01-25 PROCEDURE — 81002 URINALYSIS NONAUTO W/O SCOPE: CPT | Mod: S$GLB,,,

## 2023-01-25 PROCEDURE — 3080F DIAST BP >= 90 MM HG: CPT | Mod: CPTII,S$GLB,,

## 2023-01-25 PROCEDURE — 3008F BODY MASS INDEX DOCD: CPT | Mod: CPTII,S$GLB,,

## 2023-01-25 PROCEDURE — 99214 OFFICE O/P EST MOD 30 MIN: CPT | Mod: 25,S$GLB,,

## 2023-01-25 PROCEDURE — 51701 PR INSERTION OF NON-INDWELLING BLADDER CATHETERIZATION FOR RESIDUAL UR: ICD-10-PCS | Mod: S$GLB,,,

## 2023-01-25 PROCEDURE — 87086 URINE CULTURE/COLONY COUNT: CPT

## 2023-01-25 PROCEDURE — 3008F PR BODY MASS INDEX (BMI) DOCUMENTED: ICD-10-PCS | Mod: CPTII,S$GLB,,

## 2023-01-25 PROCEDURE — 81003 URINALYSIS AUTO W/O SCOPE: CPT

## 2023-01-25 PROCEDURE — 3074F PR MOST RECENT SYSTOLIC BLOOD PRESSURE < 130 MM HG: ICD-10-PCS | Mod: CPTII,S$GLB,,

## 2023-01-25 PROCEDURE — 87798 DETECT AGENT NOS DNA AMP: CPT

## 2023-01-25 PROCEDURE — 1159F PR MEDICATION LIST DOCUMENTED IN MEDICAL RECORD: ICD-10-PCS | Mod: CPTII,S$GLB,,

## 2023-01-25 PROCEDURE — 3080F PR MOST RECENT DIASTOLIC BLOOD PRESSURE >= 90 MM HG: ICD-10-PCS | Mod: CPTII,S$GLB,,

## 2023-01-25 PROCEDURE — 3074F SYST BP LT 130 MM HG: CPT | Mod: CPTII,S$GLB,,

## 2023-01-25 PROCEDURE — 99214 PR OFFICE/OUTPT VISIT, EST, LEVL IV, 30-39 MIN: ICD-10-PCS | Mod: 25,S$GLB,,

## 2023-01-25 PROCEDURE — 1159F MED LIST DOCD IN RCRD: CPT | Mod: CPTII,S$GLB,,

## 2023-01-25 PROCEDURE — 1160F RVW MEDS BY RX/DR IN RCRD: CPT | Mod: CPTII,S$GLB,,

## 2023-01-25 PROCEDURE — 99999 PR PBB SHADOW E&M-EST. PATIENT-LVL IV: CPT | Mod: PBBFAC,,,

## 2023-01-25 RX ORDER — MIRABEGRON 25 MG/1
25 TABLET, FILM COATED, EXTENDED RELEASE ORAL DAILY
Qty: 30 TABLET | Refills: 11 | Status: SHIPPED | OUTPATIENT
Start: 2023-01-25 | End: 2024-01-25

## 2023-01-25 NOTE — PROGRESS NOTES
CHIEF COMPLAINT:  Follow up    HISTORY OF PRESENTING ILLINESS:  Melissa Martini is a 32 y.o. female established to urology. I last saw her in clinic 1/18/2023 for UTI and renal stone evaluation. She completed a 3 day course of cipro 1/10-1/13 for c/o UTI s/s - urine culture negative. She is here today c/o lower abdominal pain occurs daily and chronic in nature. No relieving factors. Eliminated caffeine, this has helped somewhat. Denies concerns for STI. She has an IUD in place, it is not new. Denies abnormal vaginal discharge or odor. PMHx includes abnormal pap, anemia, asthma, kidney stones.    Her urine was nitrite positive on 1/18 - tx empirically with course of doxycycline, urine culture negative, mycoplasma and ureaplasma status unknown. Vaginosis screen negative. CT RSS showed 4 non-obstructing renal stones in L kidney. No hydronephrosis or other abnormalities. Urine today with ++ leuks, - nitrites.    Ovulating currently, abdominal pain is worse. She completed course of doxy - did not resolve her symptoms.     REVIEW OF SYSTEMS:  Review of Systems   Constitutional:  Negative for chills and fever.   HENT:  Negative for congestion and sore throat.    Respiratory:  Negative for cough and shortness of breath.    Cardiovascular:  Negative for chest pain and palpitations.   Gastrointestinal:  Positive for abdominal pain (lower abdominal pain). Negative for nausea and vomiting.   Genitourinary:  Positive for frequency (hourly). Negative for dysuria, flank pain, hematuria and urgency.   Neurological:  Negative for dizziness and headaches.       PATIENT HISTORY:  Past Medical History:   Diagnosis Date    Abnormal Pap smear     since , Colpo + HPV    Anemia     Asthma     mild       Past Surgical History:   Procedure Laterality Date    COLPOSCOPY      CYSTOSCOPY  6/22/2018    Procedure: CYSTOSCOPY;  Surgeon: Mau Ledesma Jr., MD;  Location: [facility] OR [address] [place] [address];  Service: Urology;;    KIDNEY STONE SURGERY  2015     right shoulder      SHOULDER SURGERY      right shoulder    THUMB ARTHROSCOPY      thumb surgery    thumb surgery      URETEROSCOPY Left 6/22/2018    Procedure: URETEROSCOPY;  Surgeon: Mau Ledesma Jr., MD;  Location: [facility] OR [address] [place] [address];  Service: Urology;  Laterality: Left;  60mins       Family History   Problem Relation Age of Onset    Ovarian cancer Paternal Grandmother     Colon cancer Neg Hx     Breast cancer Neg Hx        Social History     Socioeconomic History    Marital status:    Occupational History    Occupation: [email or web address]    Tobacco Use    Smoking status: Never    Smokeless tobacco: Never   Substance and Sexual Activity    Alcohol use: Yes     Comment: socially    Drug use: No    Sexual activity: Yes     Partners: Male     Birth control/protection: None       Allergies:  Bactrim [sulfamethoxazole-trimethoprim]    Medications:    Current Outpatient Medications:     amoxicillin-clavulanate 500-125mg (AUGMENTIN) 500-125 mg Tab, Take 1 tablet (500 mg total) by mouth 2 (two) times daily. (Patient not taking: Reported on 1/10/2023), Disp: 10 tablet, Rfl: 0    doxycycline (VIBRAMYCIN) 100 MG Cap, Take 1 capsule (100 mg total) by mouth 2 (two) times daily. (Patient not taking: Reported on 1/25/2023), Disp: 7 capsule, Rfl: 0    levonorgestrel (MIRENA) 20 mcg/24 hours (5 yrs) 52 mg IUD, 1 Intra Uterine Device by Intrauterine route once. RETURN FOR INTRAUTERINE INSERTION IN THE OFFICE for 1 dose, Disp: 1 each, Rfl: 0    mirabegron (MYRBETRIQ) 25 mg Tb24 ER tablet, Take 1 tablet (25 mg total) by mouth once daily., Disp: 30 tablet, Rfl: 11    neomycin-polymyxin-gramicidin (NEOSPORIN) ophthalmic solution, Place 1 drop into both eyes 4 (four) times daily. (Patient not taking: Reported on 1/18/2023), Disp: 10 mL, Rfl: 0    oxybutynin (DITROPAN) 5 MG Tab, Take 1 tablet (5 mg total) by mouth 3 (three) times daily. (Patient not taking: Reported on 6/17/2021), Disp: 90 tablet, Rfl: 0     oxyCODONE-acetaminophen (PERCOCET) 5-325 mg per tablet, Take 1 tablet by mouth every 6 (six) hours as needed for Pain. (Patient not taking: Reported on 2021), Disp: 6 tablet, Rfl: 0    senna-docusate 8.6-50 mg (SENNA WITH DOCUSATE SODIUM) 8.6-50 mg per tablet, Take 1 tablet by mouth once daily. (Patient not taking: Reported on 2023), Disp: 30 tablet, Rfl: 0    PHYSICAL EXAMINATION:  Physical Exam  Exam conducted with a chaperone present.   Constitutional:       Appearance: Normal appearance.   HENT:      Head: Normocephalic and atraumatic.      Right Ear: External ear normal.      Left Ear: External ear normal.   Pulmonary:      Effort: Pulmonary effort is normal. No respiratory distress.   Abdominal:      Hernia: There is no hernia in the left inguinal area or right inguinal area.   Genitourinary:     Exam position: Lithotomy position.      Pubic Area: No rash or pubic lice.       Labia:         Right: No rash, tenderness, lesion or injury.         Left: No rash, tenderness, lesion or injury.       Urethra: No prolapse, urethral pain, urethral swelling or urethral lesion.      Comments: Consent verbally obtained. Name, , allergies verified. Betadine prep x3 was applied to the urethral meatus. An in and out cath was performed after voiding. The cath PVR was 15 ml.     Lymphadenopathy:      Lower Body: No right inguinal adenopathy. No left inguinal adenopathy.   Skin:     General: Skin is warm and dry.   Neurological:      General: No focal deficit present.      Mental Status: She is alert and oriented to person, place, and time.   Psychiatric:         Mood and Affect: Mood normal.         Behavior: Behavior normal.       LABS:  ++ leuks, - nitrites  Cath PVR 15 ml       IMPRESSION:  Encounter Diagnoses   Name Primary?    Lower abdominal pain Yes    Urinary frequency        Assessment:       1. Lower abdominal pain    2. Urinary frequency        Plan:   - continue UTI precautions  - cath urine collected  for culture, mycoplasma, ureaplasma - will call with results   - Myrbetriq 25 mg daily - information provided  - pelvic floor PT referral placed  - She has follow up with obgyn and urogyn in February  - discussed [employer] web site for more information on OAB   - informed pt cystoscopy may be next step to determine IC vs OAB     Follow up as needed    I spent 30 minutes with the patient of which more than half was spent in direct consultation with the patient in regards to our treatment and plan.  We addressed the office findings and recent labs.   Education and recommendations of today's plan of care including home remedies and needed follow up with PCP.   We discussed the chief complaint; reviewed the LUTS and the possible contributory factors.   Recommended lifestyle modifications with proper, healthy diet, good hydration if no fluid restrictions; reducing bladder irritants.   Benefits of regular exercise.

## 2023-01-26 LAB
M GENITALIUM DNA UR QL NAA+PROBE: NEGATIVE
SPECIMEN SOURCE: NORMAL

## 2023-01-27 LAB — BACTERIA UR CULT: NO GROWTH

## 2023-01-30 LAB
SPECIMEN SOURCE: NORMAL
U PARVUM DNA SPEC QL NAA+PROBE: NEGATIVE
U UREALYTICUM DNA SPEC QL NAA+PROBE: NEGATIVE

## 2023-02-01 ENCOUNTER — OFFICE VISIT (OUTPATIENT)
Dept: OBSTETRICS AND GYNECOLOGY | Facility: CLINIC | Age: 33
End: 2023-02-01
Attending: OBSTETRICS & GYNECOLOGY
Payer: COMMERCIAL

## 2023-02-01 VITALS
SYSTOLIC BLOOD PRESSURE: 120 MMHG | BODY MASS INDEX: 22.2 KG/M2 | DIASTOLIC BLOOD PRESSURE: 80 MMHG | HEIGHT: 64 IN | WEIGHT: 130.06 LBS

## 2023-02-01 DIAGNOSIS — Z12.4 PAP SMEAR FOR CERVICAL CANCER SCREENING: ICD-10-CM

## 2023-02-01 DIAGNOSIS — R10.2 PELVIC PAIN: ICD-10-CM

## 2023-02-01 DIAGNOSIS — Z01.419 ENCOUNTER FOR WELL WOMAN EXAM: Primary | ICD-10-CM

## 2023-02-01 DIAGNOSIS — Z20.2 POTENTIAL EXPOSURE TO STD: ICD-10-CM

## 2023-02-01 LAB
B-HCG UR QL: NEGATIVE
CTP QC/QA: YES

## 2023-02-01 PROCEDURE — 87591 N.GONORRHOEAE DNA AMP PROB: CPT | Performed by: FAMILY MEDICINE

## 2023-02-01 PROCEDURE — 99999 PR PBB SHADOW E&M-EST. PATIENT-LVL III: ICD-10-PCS | Mod: PBBFAC,,, | Performed by: FAMILY MEDICINE

## 2023-02-01 PROCEDURE — 3008F PR BODY MASS INDEX (BMI) DOCUMENTED: ICD-10-PCS | Mod: CPTII,S$GLB,, | Performed by: FAMILY MEDICINE

## 2023-02-01 PROCEDURE — 3079F DIAST BP 80-89 MM HG: CPT | Mod: CPTII,S$GLB,, | Performed by: FAMILY MEDICINE

## 2023-02-01 PROCEDURE — 81025 URINE PREGNANCY TEST: CPT | Mod: S$GLB,,, | Performed by: FAMILY MEDICINE

## 2023-02-01 PROCEDURE — 87624 HPV HI-RISK TYP POOLED RSLT: CPT | Performed by: FAMILY MEDICINE

## 2023-02-01 PROCEDURE — 3079F PR MOST RECENT DIASTOLIC BLOOD PRESSURE 80-89 MM HG: ICD-10-PCS | Mod: CPTII,S$GLB,, | Performed by: FAMILY MEDICINE

## 2023-02-01 PROCEDURE — 1160F RVW MEDS BY RX/DR IN RCRD: CPT | Mod: CPTII,S$GLB,, | Performed by: FAMILY MEDICINE

## 2023-02-01 PROCEDURE — 88175 CYTOPATH C/V AUTO FLUID REDO: CPT | Performed by: FAMILY MEDICINE

## 2023-02-01 PROCEDURE — 99385 PR PREVENTIVE VISIT,NEW,18-39: ICD-10-PCS | Mod: S$GLB,,, | Performed by: FAMILY MEDICINE

## 2023-02-01 PROCEDURE — 1159F PR MEDICATION LIST DOCUMENTED IN MEDICAL RECORD: ICD-10-PCS | Mod: CPTII,S$GLB,, | Performed by: FAMILY MEDICINE

## 2023-02-01 PROCEDURE — 81025 POCT URINE PREGNANCY: ICD-10-PCS | Mod: S$GLB,,, | Performed by: FAMILY MEDICINE

## 2023-02-01 PROCEDURE — 3074F SYST BP LT 130 MM HG: CPT | Mod: CPTII,S$GLB,, | Performed by: FAMILY MEDICINE

## 2023-02-01 PROCEDURE — 3008F BODY MASS INDEX DOCD: CPT | Mod: CPTII,S$GLB,, | Performed by: FAMILY MEDICINE

## 2023-02-01 PROCEDURE — 3074F PR MOST RECENT SYSTOLIC BLOOD PRESSURE < 130 MM HG: ICD-10-PCS | Mod: CPTII,S$GLB,, | Performed by: FAMILY MEDICINE

## 2023-02-01 PROCEDURE — 99999 PR PBB SHADOW E&M-EST. PATIENT-LVL III: CPT | Mod: PBBFAC,,, | Performed by: FAMILY MEDICINE

## 2023-02-01 PROCEDURE — 1159F MED LIST DOCD IN RCRD: CPT | Mod: CPTII,S$GLB,, | Performed by: FAMILY MEDICINE

## 2023-02-01 PROCEDURE — 1160F PR REVIEW ALL MEDS BY PRESCRIBER/CLIN PHARMACIST DOCUMENTED: ICD-10-PCS | Mod: CPTII,S$GLB,, | Performed by: FAMILY MEDICINE

## 2023-02-01 PROCEDURE — 81514 NFCT DS BV&VAGINITIS DNA ALG: CPT | Performed by: FAMILY MEDICINE

## 2023-02-01 PROCEDURE — 99385 PREV VISIT NEW AGE 18-39: CPT | Mod: S$GLB,,, | Performed by: FAMILY MEDICINE

## 2023-02-02 NOTE — PROGRESS NOTES
HISTORY OF PRESENT ILLNESS:    Melissa Martini is a 32 y.o. female, , Patient's last menstrual period was 01/10/2023.,  presents for a routine annual exam .   Last pap:   NL   Gardisil?: no  Last mammogram: na   Last colon ca screening:  na   SA: male partner   Contraception: mirena.    Sexually transmitted infection risk: very low risk of STD exposure.   Menstrual flow: regular every 28-30 days, spotting.  -mentions for past few yrs having right pelvic pain, worsening in the past year. +dyspareunia and intermittent painful BMs. Worse sometimes in the morning and around the time of ovulation or if bladder is full. Evaluated by urology as she thought maybe kidney stone but neg w/u. No constipation. Currently no fever, uti sx, abnormal VD. No std concerns.  This is the extent of the patient's complaints at this time.     Past Medical History:   Diagnosis Date    Abnormal Pap smear     since , Colpo + HPV    Anemia     Asthma     mild       Past Surgical History:   Procedure Laterality Date    COLPOSCOPY      CYSTOSCOPY  2018    Procedure: CYSTOSCOPY;  Surgeon: Mau Ledesma Jr., MD;  Location: [facility] OR [address] [place] [address];  Service: Urology;;    KIDNEY STONE SURGERY      right shoulder      SHOULDER SURGERY      right shoulder    THUMB ARTHROSCOPY      thumb surgery    thumb surgery      URETEROSCOPY Left 2018    Procedure: URETEROSCOPY;  Surgeon: Mau Ledesma Jr., MD;  Location: [facility] OR [address] [place] [address];  Service: Urology;  Laterality: Left;  60mins       MEDICATIONS AND ALLERGIES:      Current Outpatient Medications:     amoxicillin-clavulanate 500-125mg (AUGMENTIN) 500-125 mg Tab, Take 1 tablet (500 mg total) by mouth 2 (two) times daily., Disp: 10 tablet, Rfl: 0    doxycycline (VIBRAMYCIN) 100 MG Cap, Take 1 capsule (100 mg total) by mouth 2 (two) times daily., Disp: 7 capsule, Rfl: 0    mirabegron (MYRBETRIQ) 25 mg Tb24 ER tablet, Take 1 tablet (25 mg total) by mouth once daily., Disp: 30 tablet,  Rfl: 11    neomycin-polymyxin-gramicidin (NEOSPORIN) ophthalmic solution, Place 1 drop into both eyes 4 (four) times daily., Disp: 10 mL, Rfl: 0    oxyCODONE-acetaminophen (PERCOCET) 5-325 mg per tablet, Take 1 tablet by mouth every 6 (six) hours as needed for Pain., Disp: 6 tablet, Rfl: 0    senna-docusate 8.6-50 mg (SENNA WITH DOCUSATE SODIUM) 8.6-50 mg per tablet, Take 1 tablet by mouth once daily., Disp: 30 tablet, Rfl: 0    levonorgestrel (MIRENA) 20 mcg/24 hours (5 yrs) 52 mg IUD, 1 Intra Uterine Device by Intrauterine route once. RETURN FOR INTRAUTERINE INSERTION IN THE OFFICE for 1 dose, Disp: 1 each, Rfl: 0    oxybutynin (DITROPAN) 5 MG Tab, Take 1 tablet (5 mg total) by mouth 3 (three) times daily. (Patient not taking: Reported on 2021), Disp: 90 tablet, Rfl: 0    Review of patient's allergies indicates:   Allergen Reactions    Bactrim [sulfamethoxazole-trimethoprim] Swelling     Swelling of hands and feet         Family History   Problem Relation Age of Onset    Ovarian cancer Paternal Grandmother     Colon cancer Neg Hx     Breast cancer Neg Hx        Social History     Socioeconomic History    Marital status:    Occupational History    Occupation: [employer]    Tobacco Use    Smoking status: Never    Smokeless tobacco: Never   Substance and Sexual Activity    Alcohol use: Yes     Comment: socially    Drug use: No    Sexual activity: Yes     Partners: Male     Birth control/protection: None       OB History    Para Term  AB Living   2 2 2     2   SAB IAB Ectopic Multiple Live Births         0 2      # Outcome Date GA Lbr Oneil/2nd Weight Sex Delivery Anes PTL Lv   2 Term 19 39w0d  3.51 kg (7 lb 11.8 oz) F Vag-Spont EPI N JEVON   1 Term 12 37w0d  2.863 kg (6 lb 5 oz) F Vag-Spont EPI  JEVON      Birth Comments: System Generated. Please review and update pregnancy details.          COMPREHENSIVE GYN HISTORY:  PAP History: + abnormal Paps.  Infection History:  "Denies STDs. Denies PID.  Benign History: Denies uterine fibroids. Denies ovarian cysts. Denies endometriosis. Denies other conditions.  Cancer History: Denies cervical cancer. Denies uterine cancer or hyperplasia. Denies ovarian cancer. Denies vulvar cancer or pre-cancer. Denies vaginal cancer or pre-cancer. Denies breast cancer. Denies colon cancer.      ROS:  GENERAL: No weight changes. No swelling. No fatigue. No fever.  BREASTS: No pain. No lumps. No discharge.  ABDOMEN: No pain. No nausea. No vomiting. No diarrhea. No constipation. +intermittent painful BMs  REPRODUCTIVE: No abnormal bleeding. +pelvic pain +dyspareunia  VULVA: No pain. No lesions. No itching.  VAGINA: No relaxation. No itching. No odor. No discharge. No lesions.  URINARY: No incontinence. No nocturia. No frequency. No dysuria.    /80 (BP Location: Left arm, Patient Position: Sitting, BP Method: Medium (Manual))   Ht 5' 4" (1.626 m)   Wt 59 kg (130 lb 1.1 oz)   LMP 01/10/2023   Breastfeeding No   BMI 22.33 kg/m²     PE:  Physical Exam:   Constitutional: She is oriented to person, place, and time. She appears well-developed and well-nourished. No distress.      Neck: No thyroid mass and no thyromegaly present.     Pulmonary/Chest: Right breast exhibits no inverted nipple, no mass, no nipple discharge, no skin change, no tenderness and no bleeding. Left breast exhibits no inverted nipple, no mass, no nipple discharge, no skin change, no tenderness and no bleeding.        Abdominal: Soft. There is no abdominal tenderness.     Genitourinary:    Vagina, uterus and left adnexa normal.      Pelvic exam was performed with patient supine.   The external female genitalia was normal.   Genitalia hair distrobution normal .   There is no rash or lesion on the right labia. There is no rash or lesion on the left labia. Cervix is normal. Right adnexum displays tenderness and fullness. Right adnexum displays no mass. Left adnexum displays no mass, no " tenderness and no fullness. No  no vaginal discharge, tenderness, bleeding, rectocele, cystocele or unspecified prolapse of vaginal walls in the vagina.    No foreign body in the vagina.   Cervix exhibits no motion tenderness, no lesion, no discharge, no friability, no lesion, no tenderness and no polyp.    pap smear completedUterus is not enlarged and not tender. Normal urethral meatus.IUD strings visualized.              Neurological: She is alert and oriented to person, place, and time.        PROCEDURES/ORDERS:  Pap  Std testing  TVUS to evaluate pelvic pain and IUD placement    Results for orders placed or performed in visit on 02/01/23   POCT Urine Pregnancy   Result Value Ref Range    POC Preg Test, Ur Negative Negative     Acceptable Yes        Assessment/Plan:    Encounter for well woman exam    Pelvic pain  -     US Pelvis Comp with Transvag NON-OB (xpd; Future; Expected date: 02/01/2023  -     POCT Urine Pregnancy    Potential exposure to STD  -     VAGINOSIS SCREEN BY DNA PROBE  -     C. trachomatis/N. gonorrhoeae by AMP DNA    Pap smear for cervical cancer screening  -     Liquid-Based Pap Smear, Screening  -     HPV High Risk Genotypes, PCR    -discussed different etiologies of pelvic pain, already evaluated by urology. Will r/o vaginal infections and TVUS    COUNSELING:  The patient was counseled today on:  -A.C.S. Pap and pelvic exam guidelines, recomendations for yearly mammogram, monthly self breast exams and to follow up with her PCP for other health maintenance.    FOLLOW-UP  annually for [facility].

## 2023-02-03 ENCOUNTER — TELEPHONE (OUTPATIENT)
Dept: OBSTETRICS AND GYNECOLOGY | Facility: CLINIC | Age: 33
End: 2023-02-03
Payer: COMMERCIAL

## 2023-02-03 DIAGNOSIS — N83.201 RIGHT OVARIAN CYST: Primary | ICD-10-CM

## 2023-02-03 LAB
C TRACH DNA SPEC QL NAA+PROBE: NOT DETECTED
N GONORRHOEA DNA SPEC QL NAA+PROBE: NOT DETECTED

## 2023-02-03 NOTE — TELEPHONE ENCOUNTER
Called pt to schedule pelvic ultrasound in 2-3 months. Found good date and time. Pt verbalized understanding.

## 2023-02-08 LAB
BACTERIAL VAGINOSIS DNA: NEGATIVE
CANDIDA GLABRATA DNA: NEGATIVE
CANDIDA KRUSEI DNA: NEGATIVE
CANDIDA RRNA VAG QL PROBE: NEGATIVE
T VAGINALIS RRNA GENITAL QL PROBE: NEGATIVE

## 2024-05-09 ENCOUNTER — OFFICE VISIT (OUTPATIENT)
Dept: OBSTETRICS AND GYNECOLOGY | Facility: CLINIC | Age: 34
End: 2024-05-09
Attending: OBSTETRICS & GYNECOLOGY
Payer: COMMERCIAL

## 2024-05-09 VITALS
DIASTOLIC BLOOD PRESSURE: 68 MMHG | BODY MASS INDEX: 21.27 KG/M2 | WEIGHT: 124.56 LBS | SYSTOLIC BLOOD PRESSURE: 118 MMHG | HEIGHT: 64 IN

## 2024-05-09 DIAGNOSIS — R10.2 PELVIC PAIN IN FEMALE: ICD-10-CM

## 2024-05-09 DIAGNOSIS — Z01.419 WELL FEMALE EXAM WITH ROUTINE GYNECOLOGICAL EXAM: Primary | ICD-10-CM

## 2024-05-09 PROCEDURE — 99395 PREV VISIT EST AGE 18-39: CPT | Mod: S$GLB,,, | Performed by: OBSTETRICS & GYNECOLOGY

## 2024-05-09 PROCEDURE — 99999 PR PBB SHADOW E&M-EST. PATIENT-LVL III: CPT | Mod: PBBFAC,,, | Performed by: OBSTETRICS & GYNECOLOGY

## 2024-05-09 NOTE — PROGRESS NOTES
SUBJECTIVE:   33 y.o. female   for routine gyn exam. No LMP recorded (approximate)..  She has no unusual complaints. Had 2 cm right ovarian cyst with IUD in place on pelvic u/s . Has infrequent pain episodes. Desires follow up u/s         Past Medical History:   Diagnosis Date    Abnormal Pap smear     since , Colpo + HPV    Anemia     Asthma     mild    Right ovarian cyst 2023     Past Surgical History:   Procedure Laterality Date    COLPOSCOPY      CYSTOSCOPY  2018    Procedure: CYSTOSCOPY;  Surgeon: Mau Ledesma Jr., MD;  Location: [facility] OR [address] [place] [address];  Service: Urology;;    KIDNEY STONE SURGERY      right shoulder      SHOULDER SURGERY      right shoulder    THUMB ARTHROSCOPY      thumb surgery    thumb surgery      URETEROSCOPY Left 2018    Procedure: URETEROSCOPY;  Surgeon: Mau Ledesma Jr., MD;  Location: [facility] OR [address] [place] [address];  Service: Urology;  Laterality: Left;  60mins     Social History     Socioeconomic History    Marital status:    Occupational History    Occupation: [employer]    Tobacco Use    Smoking status: Never    Smokeless tobacco: Never   Substance and Sexual Activity    Alcohol use: Yes     Comment: socially    Drug use: No    Sexual activity: Yes     Partners: Male     Birth control/protection: I.U.D.     Comment: mirena 2019     Family History   Problem Relation Name Age of Onset    Ovarian cancer Paternal Grandmother      Colon cancer Neg Hx      Breast cancer Neg Hx       OB History    Para Term  AB Living   2 2 2     2   SAB IAB Ectopic Multiple Live Births         0 2      # Outcome Date GA Lbr Oneil/2nd Weight Sex Type Anes PTL Lv   2 Term 19 39w0d  3.51 kg (7 lb 11.8 oz) F Vag-Spont EPI N JEVON   1 Term 12 37w0d  2.863 kg (6 lb 5 oz) F Vag-Spont EPI  JEVON      Birth Comments: System Generated. Please review and update pregnancy details.         Current Outpatient Medications   Medication Sig Dispense  Refill    levonorgestrel (MIRENA) 20 mcg/24 hours (5 yrs) 52 mg IUD 1 Intra Uterine Device by Intrauterine route once. RETURN FOR INTRAUTERINE INSERTION IN THE OFFICE for 1 dose 1 each 0    mirabegron (MYRBETRIQ) 25 mg Tb24 ER tablet Take 1 tablet (25 mg total) by mouth once daily. (Patient not taking: Reported on 5/9/2024) 30 tablet 11    neomycin-polymyxin-gramicidin (NEOSPORIN) ophthalmic solution Place 1 drop into both eyes 4 (four) times daily. (Patient not taking: Reported on 5/9/2024) 10 mL 0    oxybutynin (DITROPAN) 5 MG Tab Take 1 tablet (5 mg total) by mouth 3 (three) times daily. (Patient not taking: Reported on 6/17/2021) 90 tablet 0    senna-docusate 8.6-50 mg (SENNA WITH DOCUSATE SODIUM) 8.6-50 mg per tablet Take 1 tablet by mouth once daily. (Patient not taking: Reported on 5/9/2024) 30 tablet 0     No current facility-administered medications for this visit.     Allergies: Bactrim [sulfamethoxazole-trimethoprim]     ROS:  Constitutional: no weight loss, weight gain, fever, fatigue  Eyes:  No vision changes, glasses/contacts  ENT/Mouth: No ulcers, sinus problems, ears ringing, headache  Cardiovascular: No inability to lie flat, chest pain, exercise intolerance, swelling, heart palpitations  Respiratory: No wheezing, coughing blood, shortness of breath, or cough  Gastrointestinal: No diarrhea, bloody stool, nausea/vomiting, constipation, gas, hemorrhoids  Genitourinary: No blood in urine, painful urination, urgency of urination, frequency of urination, incomplete emptying, incontinence, abnormal bleeding, painful periods, heavy periods, vaginal discharge, vaginal odor, painful intercourse, sexual problems, bleeding after intercourse.  Musculoskeletal: No muscle weakness  Skin/Breast: No painful breasts, nipple discharge, masses, rash, ulcers  Neurological: No passing out, seizures, numbness, headache  Endocrine: No diabetes, hypothyroid, hyperthyroid, hot flashes, hair loss, abnormal hair growth,  "acne  Psychiatric: No depression, crying  Hematologic: No bruises, bleeding, swollen lymph nodes, anemia.      OBJECTIVE:   The patient appears well, alert, oriented x 3, in no distress.  /68   Ht 5' 4" (1.626 m)   Wt 56.5 kg (124 lb 9 oz)   LMP  (Approximate) Comment: Mirena  BMI 21.38 kg/m²   NECK: no thyromegaly, trachea midline  SKIN: no acne, striae, hirsutism  CHEST: CTAB  CV: RRR  BREAST EXAM: breasts appear normal, no suspicious masses, no skin or nipple changes or axillary nodes  ABDOMEN: no hernias, masses, or hepatosplenomegaly  GENITALIA: normal external genitalia, no erythema, no discharge  URETHRA: normal urethra, normal urethral meatus  VAGINA: Normal  CERVIX: no lesions or cervical motion tenderness. IUD strings seen  UTERUS: normal size, contour, position, consistency, mobility, non-tender  ADNEXA: no mass, fullness, tenderness      ASSESSMENT:   1. Well female exam with routine gynecological exam        2. Pelvic pain in female  US Pelvis Comp with Transvag NON-OB (xpd          PLAN:   Orders Placed This Encounter    US Pelvis Comp with Transvag NON-OB (xpd     Discussed Mirena IUD, ovarian cyst formation.  Discussed healthy lifestyle including regular exercise, healthy eating, etc.  Return to clinic in 1 year    "

## (undated) DEVICE — SOL IRR NACL .9% 3000ML

## (undated) DEVICE — SOL IRR WATER STRL 3000 ML

## (undated) DEVICE — WIRE GD LUB STD 3CM .038 150CM

## (undated) DEVICE — SYR ONLY LUER LOCK 20CC

## (undated) DEVICE — GOWN X-LG STERILE BACK

## (undated) DEVICE — PACK CYSTO

## (undated) DEVICE — TRAY CYSTO BASIN

## (undated) DEVICE — SET TUR BLADDER IRRIG Y TYPE

## (undated) DEVICE — SYR 10CC LUER LOCK